# Patient Record
Sex: FEMALE | Race: WHITE | NOT HISPANIC OR LATINO | Employment: FULL TIME | URBAN - METROPOLITAN AREA
[De-identification: names, ages, dates, MRNs, and addresses within clinical notes are randomized per-mention and may not be internally consistent; named-entity substitution may affect disease eponyms.]

---

## 2017-01-03 NOTE — PROGRESS NOTES
SUBJECTIVE:                                                    Citlali Keenan is a 61 year old female who presents to clinic today for the following health issues:      Abnormal Mood Symptoms      Duration: sx started in the fall    Description:  Depression: YES  Anxiety: YES  Panic attacks: no      Accompanying signs and symptoms: see PHQ-9 and FRANTZ scores    History (similar episodes/previous evaluation): None    Precipitating or alleviating factors: None    Therapies tried and outcome: Effexor XR (Venafaxine), hair loss, fatigue     Insomnia      Duration: last summer/fall    Description  Frequency of insomnia:  1-2 times a week or more  Time to fall asleep: varies  Middle of night awakening:  nightly  Early morning awakening:  Every morning    Accompanying signs and symptoms:  fatigue and depression/mood changes    History  Similar episodes in past:  YES  Previous evaluation/sleep study:  no     Precipitating or alleviating factors:  New stressful situation: YES  Caffeine intake after lunchtime: no   OTC decongestants: YES- certrizine  Any new medications: no     Therapies tried and outcome: melatonin, magnesium, aleve      Rash      Duration: sx for about 2 months    Description  Location: left buttock for 2 months, neck and elbow  Itching: severe    Intensity:  Sx improved    Accompanying signs and symptoms: itching, scaly, bumpy    History (similar episodes/previous evaluation): None    Precipitating or alleviating factors:  New exposures:  None  Recent travel: no      Therapies tried and outcome: hydrocortisone cream -  not effective, Benadryl/diphenhydramine -  not effective and aloe, metro gel       Rash at base of skull, has had patches on elbows and back of leg.  Has tried benadryl, hydrocortisone, aloe which are not helping.      Has a history of depression, last 10 years ago.  Was on effexor in the past, took for about 3 years.  Thinks it made her tired and contributed to her hair loss.  Has lost in  "laws,  is stressed.  Not sleeping well due to waking and ruminating.        Problem list and histories reviewed & adjusted, as indicated.  Additional history: as documented    Problem list, Medication list, Allergies, and Medical/Social/Surgical histories reviewed in [facility] and updated as appropriate.    ROS:  Constitutional, HEENT, cardiovascular, pulmonary, gi and gu systems are negative, except as otherwise noted.    OBJECTIVE:                                                    /80 mmHg  Pulse 65  Temp(Src) 97.3  F (36.3  C) (Tympanic)  Ht 5' 4\" (1.626 m)  Wt 140 lb 7 oz (63.702 kg)  BMI 24.09 kg/m2  SpO2 100%  LMP 01/01/2011  Body mass index is 24.09 kg/(m^2).  GENERAL: healthy, alert and no distress  SKIN: erythema of back of head with flaking of kin   PSYCH: mentation appears normal, affect tearful    Diagnostic Test Results:  none      ASSESSMENT/PLAN:                                                    I spent 20 min face to face with patient over 50% in counseling on issues as detailed below     (L30.9) Eczema, unspecified type  (primary encounter diagnosis)  -will treat scalp with synalar, can treat areas on body with kenalog as needed  -if not improving over 2 weeks will need to see derm  Plan: fluocinolone (SYNALAR) 0.01 % solution,         triamcinolone (KENALOG) 0.1 % ointment            (F33.1) Moderate episode of recurrent major depressive disorder (H)  -discussed therapy and medications with patient  -recommendations given for therapists  -discussed possible side effects of meds  -follow up in 1 month  Plan: escitalopram (LEXAPRO) 10 MG tablet           (G47.00) Insomnia, unspecified type  -related to depression and anxiety  -will not treat at this time, will see if lexapro improving symptoms will help with sleep     FUTURE APPOINTMENTS:       - Follow-up visit in 1 month, scheduled.      Magda Boone MD  [facility] [place]      "

## 2017-01-04 ENCOUNTER — OFFICE VISIT (OUTPATIENT)
Dept: PEDIATRICS | Facility: CLINIC | Age: 62
End: 2017-01-04
Payer: COMMERCIAL

## 2017-01-04 VITALS
DIASTOLIC BLOOD PRESSURE: 80 MMHG | TEMPERATURE: 97.3 F | SYSTOLIC BLOOD PRESSURE: 122 MMHG | HEIGHT: 64 IN | OXYGEN SATURATION: 100 % | BODY MASS INDEX: 23.98 KG/M2 | WEIGHT: 140.44 LBS | HEART RATE: 65 BPM

## 2017-01-04 DIAGNOSIS — G47.00 INSOMNIA, UNSPECIFIED TYPE: ICD-10-CM

## 2017-01-04 DIAGNOSIS — F33.1 MODERATE EPISODE OF RECURRENT MAJOR DEPRESSIVE DISORDER (H): ICD-10-CM

## 2017-01-04 DIAGNOSIS — L30.9 ECZEMA, UNSPECIFIED TYPE: Primary | ICD-10-CM

## 2017-01-04 PROCEDURE — 99214 OFFICE O/P EST MOD 30 MIN: CPT | Performed by: INTERNAL MEDICINE

## 2017-01-04 RX ORDER — TRIAMCINOLONE ACETONIDE 1 MG/G
OINTMENT TOPICAL
Qty: 80 G | Refills: 0 | Status: SHIPPED | OUTPATIENT
Start: 2017-01-04 | End: 2017-03-03 | Stop reason: SINTOL

## 2017-01-04 RX ORDER — FLUOCINOLONE ACETONIDE 0.1 MG/ML
SOLUTION TOPICAL 2 TIMES DAILY
Qty: 90 ML | Refills: 0 | Status: SHIPPED | OUTPATIENT
Start: 2017-01-04 | End: 2017-03-03 | Stop reason: SINTOL

## 2017-01-04 RX ORDER — ESCITALOPRAM OXALATE 10 MG/1
TABLET ORAL
Qty: 30 TABLET | Refills: 1 | Status: SHIPPED | OUTPATIENT
Start: 2017-01-04 | End: 2017-03-03

## 2017-01-04 ASSESSMENT — ANXIETY QUESTIONNAIRES
7. FEELING AFRAID AS IF SOMETHING AWFUL MIGHT HAPPEN: MORE THAN HALF THE DAYS
GAD7 TOTAL SCORE: 8
IF YOU CHECKED OFF ANY PROBLEMS ON THIS QUESTIONNAIRE, HOW DIFFICULT HAVE THESE PROBLEMS MADE IT FOR YOU TO DO YOUR WORK, TAKE CARE OF THINGS AT HOME, OR GET ALONG WITH OTHER PEOPLE: SOMEWHAT DIFFICULT
2. NOT BEING ABLE TO STOP OR CONTROL WORRYING: MORE THAN HALF THE DAYS
3. WORRYING TOO MUCH ABOUT DIFFERENT THINGS: MORE THAN HALF THE DAYS
6. BECOMING EASILY ANNOYED OR IRRITABLE: MORE THAN HALF THE DAYS
5. BEING SO RESTLESS THAT IT IS HARD TO SIT STILL: NOT AT ALL
1. FEELING NERVOUS, ANXIOUS, OR ON EDGE: NOT AT ALL

## 2017-01-04 ASSESSMENT — PATIENT HEALTH QUESTIONNAIRE - PHQ9: 5. POOR APPETITE OR OVEREATING: NOT AT ALL

## 2017-01-04 NOTE — MR AVS SNAPSHOT
After Visit Summary   1/4/2017    Citlali Keenan    MRN: [id number]           Patient Information     Date Of Birth          1955        Visit Information        Provider Department      1/4/2017 8:20 AM Magda Boone MD [facility]        Today's Diagnoses     Eczema, unspecified type    -  1     Moderate episode of recurrent major depressive disorder (H)         Insomnia, unspecified type           Care Instructions    [facility]        Follow-ups after your visit        Your next 10 appointments already scheduled     Jan 31, 2017  8:40 AM   Office Visit with Magda Boone MD   [facility] ([facility])    [address]  [address]  [place] [address] [phone]           Bring a current list of meds and any records pertaining to this visit.  For Physicals, please bring immunization records and any forms needing to be filled out.  Please arrive 10 minutes early to complete paperwork.              Who to contact     If you have questions or need follow up information about today's clinic visit or your schedule please contact [facility] directly at [phone].  Normal or non-critical lab and imaging results will be communicated to you by MyChart, letter or phone within 4 business days after the clinic has received the results. If you do not hear from us within 7 days, please contact the clinic through [employer] or phone. If you have a critical or abnormal lab result, we will notify you by phone as soon as possible.  Submit refill requests through [employer] or call your pharmacy and they will forward the refill request to us. Please allow 3 business days for your refill to be completed.          Additional Information About Your Visit        MyChart Information     [employer] gives you secure access to your electronic health record. If you see a  "primary care provider, you can also send messages to your care team and make appointments. If you have questions, please call your primary care clinic.  If you do not have a primary care provider, please call [phone] and they will assist you.        Care EveryWhere ID     This is your Care EveryWhere ID. This could be used by other organizations to access your [facility] medical records  [id number]        Your Vitals Were     Pulse Temperature Height BMI (Body Mass Index) Pulse Oximetry Last Period    65 97.3  F (36.3  C) (Tympanic) 5' 4\" (1.626 m) 24.09 kg/m2 100% 01/01/2011       Blood Pressure from Last 3 Encounters:   01/04/17 122/80   09/07/16 114/70   09/08/15 120/80    Weight from Last 3 Encounters:   01/04/17 140 lb 7 oz (63.702 kg)   09/07/16 142 lb 8 oz (64.638 kg)   09/08/15 135 lb 3 oz (61.321 kg)              Today, you had the following     No orders found for display         Today's Medication Changes          These changes are accurate as of: 1/4/17  9:13 AM.  If you have any questions, ask your nurse or doctor.               Start taking these medicines.        Dose/Directions    escitalopram 10 MG tablet   Commonly known as:  LEXAPRO   Used for:  Moderate episode of recurrent major depressive disorder (H)   Started by:  Magda Boone MD        1/2 pill daily x 7 days then 1 pill daily   Quantity:  30 tablet   Refills:  1       fluocinolone 0.01 % solution   Commonly known as:  SYNALAR   Used for:  Eczema, unspecified type   Started by:  Magda Boone MD        Apply topically 2 times daily   Quantity:  90 mL   Refills:  0       triamcinolone 0.1 % ointment   Commonly known as:  KENALOG   Used for:  Eczema, unspecified type   Started by:  Magda Boone MD        Apply sparingly to affected area three times daily for 14 days.   Quantity:  80 g   Refills:  0            Where to get your medicines      These medications were sent to [facility]/pharmacy #[id number] - [place], " [place] - [address] AT [facility]  [address], [place] [address]     Phone:  [phone]    - escitalopram 10 MG tablet  - fluocinolone 0.01 % solution  - triamcinolone 0.1 % ointment             Primary Care Provider Office Phone # Fax #    Magda Boone [phone]       [facility] [address] [place] [address]        Thank you!     Thank you for choosing [facility]  for your care. Our goal is always to provide you with excellent care. Hearing back from our patients is one way we can continue to improve our services. Please take a few minutes to complete the written survey that you may receive in the mail after your visit with us. Thank you!             Your Updated Medication List - Protect others around you: Learn how to safely use, store and throw away your medicines at www.disposemymeds.org.          This list is accurate as of: 1/4/17  9:13 AM.  Always use your most recent med list.                   Brand Name Dispense Instructions for use    BIOTIN          cetirizine 10 MG tablet    zyrTEC     Take 10 mg by mouth daily       escitalopram 10 MG tablet    LEXAPRO    30 tablet    1/2 pill daily x 7 days then 1 pill daily       fluocinolone 0.01 % solution    SYNALAR    90 mL    Apply topically 2 times daily       ibuprofen 200 MG capsule      Take 200 mg by mouth every 4 hours as needed for fever.       lisinopril 20 MG tablet    PRINIVIL/ZESTRIL    90 tablet    Take 1 tablet (20 mg) by mouth daily       MULTIVITAMINS PO      Take  by mouth daily.       olopatadine 0.1 % ophthalmic solution    PATANOL    1 Bottle    Place 1 drop into both eyes 2 times daily       triamcinolone 0.1 % ointment    KENALOG    80 g    Apply sparingly to affected area three times daily for 14 days.

## 2017-01-04 NOTE — NURSING NOTE
"Chief Complaint   Patient presents with     Derm Problem     Insomnia     Depression       Initial /80 mmHg  Pulse 65  Temp(Src) 97.3  F (36.3  C) (Tympanic)  Ht 5' 4\" (1.626 m)  Wt 140 lb 7 oz (63.702 kg)  BMI 24.09 kg/m2  SpO2 100%  LMP 01/01/2011 Estimated body mass index is 24.09 kg/(m^2) as calculated from the following:    Height as of this encounter: 5' 4\" (1.626 m).    Weight as of this encounter: 140 lb 7 oz (63.702 kg).  BP completed using cuff size: regular  Maggy Koch, RYANN      "

## 2017-01-05 ASSESSMENT — PATIENT HEALTH QUESTIONNAIRE - PHQ9: SUM OF ALL RESPONSES TO PHQ QUESTIONS 1-9: 10

## 2017-01-05 ASSESSMENT — ANXIETY QUESTIONNAIRES: GAD7 TOTAL SCORE: 8

## 2017-03-02 NOTE — PROGRESS NOTES
SUBJECTIVE:                                                    Citlali Keenan is a 62 year old female who presents to clinic today for the following health issues:      Depression Followup    Status since last visit: Improved     See PHQ-9 for current symptoms.  Other associated symptoms: None    Complicating factors:   Significant life event:  No   Current substance abuse:  None  Anxiety or Panic symptoms:  No    PHQ-9  English PHQ-9   Any Language            Amount of exercise or physical activity: None    Problems taking medications regularly: No    Medication side effects: eczema med-causes burning sensation  Diet: regular (no restrictions)    Anxiety- feels that mood is more level, thoughts easier to control.  No side effects of medications noted.  Feels that relationships are much better in general.  Has not seen a therapist but is planning on it.      Still having problems with eczema, had burning sensation on scalp with use of medications.  Would like to have a skin check.         Problem list and histories reviewed & adjusted, as indicated.  Additional history: as documented    Patient Active Problem List   Diagnosis     CARDIOVASCULAR SCREENING; LDL GOAL LESS THAN 160     Advanced directives, counseling/discussion     HTN (hypertension)     Raynaud's phenomenon     Osteopenia     Menopause     Family history of breast cancer in mother     Past Surgical History   Procedure Laterality Date     C ligate fallopian tube       C anesth,blepharoplasty  2004       Social History   Substance Use Topics     Smoking status: Passive Smoke Exposure - Never Smoker     Smokeless tobacco: Never Used      Comment:  a closet smoker     Alcohol use Yes      Comment: 2 glasses of wine per month     Family History   Problem Relation Age of Onset     Hypertension Mother      Lipids Mother      Breast Cancer Mother      mother diagnose with breast cancer last fall and had lumpectomy at age 83     Skin Cancer Mother       "basal cell      Hypertension Father      Hearing Loss Father      father has hearing issue, fitted for hearing aid at age 83     Prostate Cancer Maternal Grandfather      CANCER Paternal Grandmother      ovarian?           Reviewed and updated as needed this visit by clinical staff  Tobacco  Allergies  Meds  Med Hx  Surg Hx  Fam Hx  Soc Hx      Reviewed and updated as needed this visit by Provider         ROS:  Constitutional, HEENT, cardiovascular, pulmonary, gi and gu systems are negative, except as otherwise noted.    OBJECTIVE:                                                    /76 (BP Location: Right arm, Patient Position: Chair, Cuff Size: Adult Regular)  Pulse 63  Temp 97.4  F (36.3  C) (Tympanic)  Ht 5' 4\" (1.626 m)  Wt 135 lb 6 oz (61.4 kg)  LMP 01/01/2011  SpO2 95%  BMI 23.24 kg/m2  Body mass index is 23.24 kg/(m^2).  GENERAL: healthy, alert and no distress  PSYCH: mentation appears normal, affect normal/bright    Diagnostic Test Results:  none      ASSESSMENT/PLAN:                                                    I spent 15 min face to face with patient over 50% in counseling on issues as detailed below.      (F33.1) Moderate episode of recurrent major depressive disorder (H)  (primary encounter diagnosis)  -doing much better on lexapro, phq 9 decreased from 10 to 3  -now controlled  -encouraged therapy to discuss issues that lead to depression  Plan: escitalopram (LEXAPRO) 10 MG tablet           (L30.8) Other eczema  -will refer to dermatology  Plan: DERMATOLOGY REFERRAL            (Z12.31) Visit for screening mammogram  Plan: *MA Screening Digital Bilateral           FUTURE APPOINTMENTS:       - Follow-up for annual visit or as needed    Magda Boone MD  [facility] [place]    "

## 2017-03-03 ENCOUNTER — OFFICE VISIT (OUTPATIENT)
Dept: PEDIATRICS | Facility: CLINIC | Age: 62
End: 2017-03-03
Payer: COMMERCIAL

## 2017-03-03 VITALS
HEART RATE: 63 BPM | WEIGHT: 135.38 LBS | DIASTOLIC BLOOD PRESSURE: 76 MMHG | OXYGEN SATURATION: 95 % | TEMPERATURE: 97.4 F | BODY MASS INDEX: 23.11 KG/M2 | HEIGHT: 64 IN | SYSTOLIC BLOOD PRESSURE: 104 MMHG

## 2017-03-03 DIAGNOSIS — F33.1 MODERATE EPISODE OF RECURRENT MAJOR DEPRESSIVE DISORDER (H): Primary | ICD-10-CM

## 2017-03-03 DIAGNOSIS — Z12.31 VISIT FOR SCREENING MAMMOGRAM: ICD-10-CM

## 2017-03-03 DIAGNOSIS — L30.8 OTHER ECZEMA: ICD-10-CM

## 2017-03-03 PROCEDURE — 99213 OFFICE O/P EST LOW 20 MIN: CPT | Performed by: INTERNAL MEDICINE

## 2017-03-03 RX ORDER — ESCITALOPRAM OXALATE 10 MG/1
10 TABLET ORAL DAILY
Qty: 90 TABLET | Refills: 3 | Status: SHIPPED | OUTPATIENT
Start: 2017-03-03 | End: 2017-10-13

## 2017-03-03 ASSESSMENT — ANXIETY QUESTIONNAIRES
2. NOT BEING ABLE TO STOP OR CONTROL WORRYING: SEVERAL DAYS
3. WORRYING TOO MUCH ABOUT DIFFERENT THINGS: SEVERAL DAYS
5. BEING SO RESTLESS THAT IT IS HARD TO SIT STILL: NOT AT ALL
1. FEELING NERVOUS, ANXIOUS, OR ON EDGE: NOT AT ALL
7. FEELING AFRAID AS IF SOMETHING AWFUL MIGHT HAPPEN: SEVERAL DAYS
6. BECOMING EASILY ANNOYED OR IRRITABLE: NOT AT ALL
GAD7 TOTAL SCORE: 3
IF YOU CHECKED OFF ANY PROBLEMS ON THIS QUESTIONNAIRE, HOW DIFFICULT HAVE THESE PROBLEMS MADE IT FOR YOU TO DO YOUR WORK, TAKE CARE OF THINGS AT HOME, OR GET ALONG WITH OTHER PEOPLE: NOT DIFFICULT AT ALL

## 2017-03-03 ASSESSMENT — PATIENT HEALTH QUESTIONNAIRE - PHQ9: 5. POOR APPETITE OR OVEREATING: NOT AT ALL

## 2017-03-03 NOTE — MR AVS SNAPSHOT
After Visit Summary   3/3/2017    Citlali Keenan    MRN: [id number]           Patient Information     Date Of Birth          1955        Visit Information        Provider Department      3/3/2017 1:00 PM Magda [facility]one MD [facility]        Today's Diagnoses     Moderate episode of recurrent major depressive disorder (H)    -  1    Other eczema        Visit for screening mammogram           Follow-ups after your visit        Additional Services     DERMATOLOGY REFERRAL       Your provider has referred you to: [facility]: [facility] [place] [phone]   [email or web address]    Please be aware that coverage of these services is subject to the terms and limitations of your health insurance plan.  Call member services at your health plan with any benefit or coverage questions.      Please bring the following with you to your appointment:    (1) Any X-Rays, CTs or MRIs which have been performed.  Contact the facility where they were done to arrange for  prior to your scheduled appointment.    (2) List of current medications  (3) This referral request   (4) Any documents/labs given to you for this referral                  Your next 10 appointments already scheduled     Mar 03, 2017  1:30 PM CST   MA SCREENING DIGITAL BILATERAL with EAMA1   [facility] [place] ([facility])    [address] ,[address]  [place] [address] [phone]           Do not use any powder, lotion or deodorant under your arms or on your breast. If you do, we will ask you to remove it before your exam.  Wear comfortable, two-piece clothing.  If you have any allergies, tell your care team.  Bring any previous mammograms from other facilities or have them mailed to the breast center.              Who to contact     If you have questions or need follow up information about today's clinic visit or your schedule please contact [facility]  "[place] directly at [phone].  Normal or non-critical lab and imaging results will be communicated to you by [employer], letter or phone within 4 business days after the clinic has received the results. If you do not hear from us within 7 days, please contact the clinic through [employer] or phone. If you have a critical or abnormal lab result, we will notify you by phone as soon as possible.  Submit refill requests through [employer] or call your pharmacy and they will forward the refill request to us. Please allow 3 business days for your refill to be completed.          Additional Information About Your Visit        [employer] Information     [employer] gives you secure access to your electronic health record. If you see a primary care provider, you can also send messages to your care team and make appointments. If you have questions, please call your primary care clinic.  If you do not have a primary care provider, please call [phone] and they will assist you.        Care EveryWhere ID     This is your Care EveryWhere ID. This could be used by other organizations to access your [facility] medical records  [id number]        Your Vitals Were     Pulse Temperature Height Last Period Pulse Oximetry BMI (Body Mass Index)    63 97.4  F (36.3  C) (Tympanic) 5' 4\" (1.626 m) 01/01/2011 95% 23.24 kg/m2       Blood Pressure from Last 3 Encounters:   03/03/17 104/76   01/04/17 122/80   09/07/16 114/70    Weight from Last 3 Encounters:   03/03/17 135 lb 6 oz (61.4 kg)   01/04/17 140 lb 7 oz (63.7 kg)   09/07/16 142 lb 8 oz (64.6 kg)              We Performed the Following     DERMATOLOGY REFERRAL          Today's Medication Changes          These changes are accurate as of: 3/3/17  1:19 PM.  If you have any questions, ask your nurse or doctor.               These medicines have changed or have updated prescriptions.        Dose/Directions    escitalopram 10 MG tablet   Commonly known as:  LEXAPRO   This may have changed:    - how " much to take  - how to take this  - when to take this   Used for:  Moderate episode of recurrent major depressive disorder (H)   Changed by:  Magda Boone MD        Dose:  10 mg   Take 1 tablet (10 mg) by mouth daily 1/2 pill daily x 7 days then 1 pill daily   Quantity:  90 tablet   Refills:  3         Stop taking these medicines if you haven't already. Please contact your care team if you have questions.     fluocinolone 0.01 % solution   Commonly known as:  SYNALAR   Stopped by:  Magda Boone MD           triamcinolone 0.1 % ointment   Commonly known as:  KENALOG   Stopped by:  Magda Boone MD                Where to get your medicines      These medications were sent to [facility]/pharmacy #[id number] - [place], [place] - [address] [place] AT [address] [place], [place] [address]     Phone:  [phone]     escitalopram 10 MG tablet                Primary Care Provider Office Phone # Fax #    Magda Boone [phone]       [address] [place] [address]        Thank you!     Thank you for choosing [facility]  for your care. Our goal is always to provide you with excellent care. Hearing back from our patients is one way we can continue to improve our services. Please take a few minutes to complete the written survey that you may receive in the mail after your visit with us. Thank you!             Your Updated Medication List - Protect others around you: Learn how to safely use, store and throw away your medicines at www.disposemymeds.org.          This list is accurate as of: 3/3/17  1:19 PM.  Always use your most recent med list.                   Brand Name Dispense Instructions for use    BIOTIN          cetirizine 10 MG tablet    zyrTEC     Take 10 mg by mouth daily       escitalopram 10 MG tablet    LEXAPRO    90 tablet    Take 1 tablet (10 mg) by mouth daily 1/2 pill daily x 7  days then 1 pill daily       ibuprofen 200 MG capsule      Take 200 mg by mouth every 4 hours as needed for fever.       lisinopril 20 MG tablet    PRINIVIL/ZESTRIL    90 tablet    Take 1 tablet (20 mg) by mouth daily       MULTIVITAMINS PO      Take  by mouth daily.       olopatadine 0.1 % ophthalmic solution    PATANOL    1 Bottle    Place 1 drop into both eyes 2 times daily

## 2017-03-04 ASSESSMENT — PATIENT HEALTH QUESTIONNAIRE - PHQ9: SUM OF ALL RESPONSES TO PHQ QUESTIONS 1-9: 3

## 2017-03-04 ASSESSMENT — ANXIETY QUESTIONNAIRES: GAD7 TOTAL SCORE: 3

## 2017-04-11 ENCOUNTER — TRANSFERRED RECORDS (OUTPATIENT)
Dept: HEALTH INFORMATION MANAGEMENT | Facility: CLINIC | Age: 62
End: 2017-04-11

## 2017-04-24 ENCOUNTER — RADIANT APPOINTMENT (OUTPATIENT)
Dept: MAMMOGRAPHY | Facility: CLINIC | Age: 62
End: 2017-04-24
Attending: INTERNAL MEDICINE
Payer: COMMERCIAL

## 2017-04-24 DIAGNOSIS — Z12.31 VISIT FOR SCREENING MAMMOGRAM: ICD-10-CM

## 2017-04-24 PROCEDURE — 77063 BREAST TOMOSYNTHESIS BI: CPT | Mod: TC

## 2017-04-24 PROCEDURE — G0202 SCR MAMMO BI INCL CAD: HCPCS | Mod: TC

## 2017-10-13 ENCOUNTER — OFFICE VISIT (OUTPATIENT)
Dept: PEDIATRICS | Facility: CLINIC | Age: 62
End: 2017-10-13
Payer: COMMERCIAL

## 2017-10-13 VITALS
HEIGHT: 64 IN | DIASTOLIC BLOOD PRESSURE: 72 MMHG | SYSTOLIC BLOOD PRESSURE: 108 MMHG | BODY MASS INDEX: 23.63 KG/M2 | WEIGHT: 138.4 LBS | HEART RATE: 68 BPM | OXYGEN SATURATION: 99 % | TEMPERATURE: 97.9 F

## 2017-10-13 DIAGNOSIS — I10 ESSENTIAL HYPERTENSION: ICD-10-CM

## 2017-10-13 DIAGNOSIS — Z00.00 ROUTINE GENERAL MEDICAL EXAMINATION AT A HEALTH CARE FACILITY: Primary | ICD-10-CM

## 2017-10-13 DIAGNOSIS — F33.1 MODERATE EPISODE OF RECURRENT MAJOR DEPRESSIVE DISORDER (H): ICD-10-CM

## 2017-10-13 LAB
ANION GAP SERPL CALCULATED.3IONS-SCNC: 12 MMOL/L (ref 3–14)
BUN SERPL-MCNC: 15 MG/DL (ref 7–30)
CALCIUM SERPL-MCNC: 8.9 MG/DL (ref 8.5–10.1)
CHLORIDE SERPL-SCNC: 108 MMOL/L (ref 94–109)
CO2 SERPL-SCNC: 24 MMOL/L (ref 20–32)
CREAT SERPL-MCNC: 0.84 MG/DL (ref 0.52–1.04)
CREAT UR-MCNC: 196 MG/DL
GFR SERPL CREATININE-BSD FRML MDRD: 68 ML/MIN/1.7M2
GLUCOSE SERPL-MCNC: 87 MG/DL (ref 70–99)
MICROALBUMIN UR-MCNC: 10 MG/L
MICROALBUMIN/CREAT UR: 5.1 MG/G CR (ref 0–25)
POTASSIUM SERPL-SCNC: 3.9 MMOL/L (ref 3.4–5.3)
SODIUM SERPL-SCNC: 144 MMOL/L (ref 133–144)

## 2017-10-13 PROCEDURE — 36415 COLL VENOUS BLD VENIPUNCTURE: CPT | Performed by: INTERNAL MEDICINE

## 2017-10-13 PROCEDURE — 80048 BASIC METABOLIC PNL TOTAL CA: CPT | Performed by: INTERNAL MEDICINE

## 2017-10-13 PROCEDURE — 99396 PREV VISIT EST AGE 40-64: CPT | Performed by: INTERNAL MEDICINE

## 2017-10-13 PROCEDURE — 82043 UR ALBUMIN QUANTITATIVE: CPT | Performed by: INTERNAL MEDICINE

## 2017-10-13 RX ORDER — LISINOPRIL 20 MG/1
20 TABLET ORAL DAILY
Qty: 90 TABLET | Refills: 3 | Status: SHIPPED | OUTPATIENT
Start: 2017-10-13 | End: 2018-09-21

## 2017-10-13 RX ORDER — ESCITALOPRAM OXALATE 10 MG/1
10 TABLET ORAL DAILY
Qty: 90 TABLET | Refills: 3 | Status: SHIPPED | OUTPATIENT
Start: 2017-10-13 | End: 2018-11-27

## 2017-10-13 NOTE — MR AVS SNAPSHOT
After Visit Summary   10/13/2017    Citlali Keenan    MRN: [id number]           Patient Information     Date Of Birth          1955        Visit Information        Provider Department      10/13/2017 8:40 AM Magda Boone MD [facility]        Today's Diagnoses     Routine general medical examination at a health care facility    -  1    Essential hypertension        Moderate episode of recurrent major depressive disorder (H)          Care Instructions    Pay attention to the salt in the diet that you are not salting such as processed foods and soups. Aim for 2.5 MG of salt a day.     Next time you are at the pharmacy, ask about the shingles vaccination and your insurance, they can tell you if it is covered.     You will have blood work today.       Preventive Health Recommendations  Female Ages 50 - 64    Yearly exam: See your health care provider every year in order to  o Review health changes.   o Discuss preventive care.    o Review your medicines if your doctor has prescribed any.      Get a Pap test every three years (unless you have an abnormal result and your provider advises testing more often).    If you get Pap tests with HPV test, you only need to test every 5 years, unless you have an abnormal result.     You do not need a Pap test if your uterus was removed (hysterectomy) and you have not had cancer.    You should be tested each year for STDs (sexually transmitted diseases) if you're at risk.     Have a mammogram every 1 to 2 years.    Have a colonoscopy at age 50, or have a yearly FIT test (stool test). These exams screen for colon cancer.      Have a cholesterol test every 5 years, or more often if advised.    Have a diabetes test (fasting glucose) every three years. If you are at risk for diabetes, you should have this test more often.     If you are at risk for osteoporosis (brittle bone disease), think about having a bone density scan (DEXA).    Shots: Get  a flu shot each year. Get a tetanus shot every 10 years.    Nutrition:     Eat at least 5 servings of fruits and vegetables each day.    Eat whole-grain bread, whole-wheat pasta and brown rice instead of white grains and rice.    Talk to your provider about Calcium and Vitamin D.     Lifestyle    Exercise at least 150 minutes a week (30 minutes a day, 5 days a week). This will help you control your weight and prevent disease.    Limit alcohol to one drink per day.    No smoking.     Wear sunscreen to prevent skin cancer.     See your dentist every six months for an exam and cleaning.    See your eye doctor every 1 to 2 years.            Follow-ups after your visit        Who to contact     If you have questions or need follow up information about today's clinic visit or your schedule please contact [facility] directly at [phone].  Normal or non-critical lab and imaging results will be communicated to you by [employer], letter or phone within 4 business days after the clinic has received the results. If you do not hear from us within 7 days, please contact the clinic through [facility] or phone. If you have a critical or abnormal lab result, we will notify you by phone as soon as possible.  Submit refill requests through [employer] or call your pharmacy and they will forward the refill request to us. Please allow 3 business days for your refill to be completed.          Additional Information About Your Visit        [employer] Information     [employer] gives you secure access to your electronic health record. If you see a primary care provider, you can also send messages to your care team and make appointments. If you have questions, please call your primary care clinic.  If you do not have a primary care provider, please call [phone] and they will assist you.        Care EveryWhere ID     This is your Care EveryWhere ID. This could be used by other organizations to access your [facility]  "records  [id number]        Your Vitals Were     Pulse Temperature Height Last Period Pulse Oximetry BMI (Body Mass Index)    68 97.9  F (36.6  C) (Tympanic) 5' 4\" (1.626 m) 01/01/2011 99% 23.76 kg/m2       Blood Pressure from Last 3 Encounters:   10/13/17 108/72   03/03/17 104/76   01/04/17 122/80    Weight from Last 3 Encounters:   10/13/17 138 lb 6.4 oz (62.8 kg)   03/03/17 135 lb 6 oz (61.4 kg)   01/04/17 140 lb 7 oz (63.7 kg)              We Performed the Following     Albumin Random Urine Quantitative with Creat Ratio     BASIC METABOLIC PANEL          Today's Medication Changes          These changes are accurate as of: 10/13/17  9:23 AM.  If you have any questions, ask your nurse or doctor.               These medicines have changed or have updated prescriptions.        Dose/Directions    escitalopram 10 MG tablet   Commonly known as:  LEXAPRO   This may have changed:  additional instructions   Used for:  Moderate episode of recurrent major depressive disorder (H)   Changed by:  Magda Boone MD        Dose:  10 mg   Take 1 tablet (10 mg) by mouth daily   Quantity:  90 tablet   Refills:  3       lisinopril 20 MG tablet   Commonly known as:  PRINIVIL/ZESTRIL   This may have changed:  See the new instructions.   Used for:  Essential hypertension   Changed by:  Magda Boone MD        Dose:  20 mg   Take 1 tablet (20 mg) by mouth daily   Quantity:  90 tablet   Refills:  3            Where to get your medicines      These medications were sent to [facility]/pharmacy #[id number] - [place], [place] - [address] AT [place] [address], [place] [address]     Phone:  [phone]     escitalopram 10 MG tablet    lisinopril 20 MG tablet                Primary Care Provider Office Phone # Fax #    Magda Boone [phone]       [facility] [place] [address]        Equal Access to Services     [facility] AH: Christie perla " Rosa, nadirada luconcepcionadaha, qaybta kamalcolm schmid, carmen michaud traceykatiana laRosiedavid annette. So [facility] [phone].    ATENCIÓN: Si jasmina figueroa, tiene a sharp disposición servicios gratuitos de asistencia lingüística. Sherwin al [phone].    We comply with applicable federal civil rights laws and [place] laws. We do not discriminate on the basis of race, color, national origin, age, disability, sex, sexual orientation, or gender identity.            Thank you!     Thank you for choosing [facility] [place]  for your care. Our goal is always to provide you with excellent care. Hearing back from our patients is one way we can continue to improve our services. Please take a few minutes to complete the written survey that you may receive in the mail after your visit with us. Thank you!             Your Updated Medication List - Protect others around you: Learn how to safely use, store and throw away your medicines at www.disposemymeds.org.          This list is accurate as of: 10/13/17  9:23 AM.  Always use your most recent med list.                   Brand Name Dispense Instructions for use Diagnosis    BIOTIN           cetirizine 10 MG tablet    zyrTEC     Take 10 mg by mouth daily        escitalopram 10 MG tablet    LEXAPRO    90 tablet    Take 1 tablet (10 mg) by mouth daily    Moderate episode of recurrent major depressive disorder (H)       ibuprofen 200 MG capsule      Take 200 mg by mouth every 4 hours as needed for fever.        lisinopril 20 MG tablet    PRINIVIL/ZESTRIL    90 tablet    Take 1 tablet (20 mg) by mouth daily    Essential hypertension       MULTIVITAMINS PO      Take  by mouth daily.        olopatadine 0.1 % ophthalmic solution    PATANOL    1 Bottle    Place 1 drop into both eyes 2 times daily    Chronic allergic conjunctivitis

## 2017-10-13 NOTE — PATIENT INSTRUCTIONS
Pay attention to the salt in the diet that you are not salting such as processed foods and soups. Aim for 2.5 MG of salt a day.     Next time you are at the pharmacy, ask about the shingles vaccination and your insurance, they can tell you if it is covered.     You will have blood work today.       Preventive Health Recommendations  Female Ages 50 - 64    Yearly exam: See your health care provider every year in order to  o Review health changes.   o Discuss preventive care.    o Review your medicines if your doctor has prescribed any.      Get a Pap test every three years (unless you have an abnormal result and your provider advises testing more often).    If you get Pap tests with HPV test, you only need to test every 5 years, unless you have an abnormal result.     You do not need a Pap test if your uterus was removed (hysterectomy) and you have not had cancer.    You should be tested each year for STDs (sexually transmitted diseases) if you're at risk.     Have a mammogram every 1 to 2 years.    Have a colonoscopy at age 50, or have a yearly FIT test (stool test). These exams screen for colon cancer.      Have a cholesterol test every 5 years, or more often if advised.    Have a diabetes test (fasting glucose) every three years. If you are at risk for diabetes, you should have this test more often.     If you are at risk for osteoporosis (brittle bone disease), think about having a bone density scan (DEXA).    Shots: Get a flu shot each year. Get a tetanus shot every 10 years.    Nutrition:     Eat at least 5 servings of fruits and vegetables each day.    Eat whole-grain bread, whole-wheat pasta and brown rice instead of white grains and rice.    Talk to your provider about Calcium and Vitamin D.     Lifestyle    Exercise at least 150 minutes a week (30 minutes a day, 5 days a week). This will help you control your weight and prevent disease.    Limit alcohol to one drink per day.    No smoking.     Wear  sunscreen to prevent skin cancer.     See your dentist every six months for an exam and cleaning.    See your eye doctor every 1 to 2 years.

## 2017-10-13 NOTE — NURSING NOTE
"Chief Complaint   Patient presents with     Physical       Initial /72 (BP Location: Right arm, Patient Position: Sitting, Cuff Size: Adult Regular)  Pulse 68  Temp 97.9  F (36.6  C) (Tympanic)  Ht 5' 4\" (1.626 m)  Wt 138 lb 6.4 oz (62.8 kg)  LMP 01/01/2011  SpO2 99%  BMI 23.76 kg/m2 Estimated body mass index is 23.76 kg/(m^2) as calculated from the following:    Height as of this encounter: 5' 4\" (1.626 m).    Weight as of this encounter: 138 lb 6.4 oz (62.8 kg).  Medication Reconciliation: luis Izaguirre      "

## 2017-10-13 NOTE — PROGRESS NOTES
SUBJECTIVE:   CC: Citlali Keenan is an 62 year old woman who presents for preventive health visit.     Citlali presents to the clinic for her annual physical. Patient is currently taking lisinopril 20 MG for hypertension and Lexapro 10MG for depression.     She reports she has been experiencing muscle aches in her legs, arms, shoulders and hips. She uses a heat pad at night to help. Patient works lifting heavy boxes and thinks this may be a cause. Denies joint pain.     States her energy is still low and has been trying to walk more but would like to exercise more. Patient would like to start stretching and yoga but cannot find the time. She is close to care home and will take a some Friday's off, trying to not stress herself.    Mood is reported to be good. States she tried to go off her Lexapro. She tried for 1 week and got withdrawal sx and started back on medication.     Patient reports not taking BP at home. She took a biometrics screening at work and BP was good. Denies cough, chest pain, shortness of breath and presyncopal episodes. BP in clinic was 108/72.    She states she is careful about salts in her diet; realizes she needs to eat less processed foods. Weight in clinic was 138 lbs.     Patient is wondering about shingles vaccination; if she should wait for the new shingles vaccination is out.       Healthy Habits:  Answers for HPI/ROS submitted by the patient on 10/13/2017   Annual Exam:  Getting at least 3 servings of Calcium per day:: Yes  Bi-annual eye exam:: Yes  Dental care twice a year:: Yes  Sleep apnea or symptoms of sleep apnea:: Daytime drowsiness  Diet:: Low salt, Carbohydrate counting  Frequency of exercise:: 1 day/week  Taking medications regularly:: Yes  Medication side effects:: Muscle aches  PHQ-2 Score: 0  Duration of exercise:: 15-30 minutes              Today's PHQ-2 Score:   PHQ-2 ( 1999 Pfizer) 3/3/2017 1/4/2017   Q1: Little interest or pleasure in doing things 0 1   Q2: Feeling  down, depressed or hopeless 1 1   PHQ-2 Score 1 2   Q1: Little interest or pleasure in doing things - -   Q2: Feeling down, depressed or hopeless - -   PHQ-2 Score - -       Abuse: Current or Past(Physical, Sexual or Emotional)- No  Do you feel safe in your environment - Yes    Social History   Substance Use Topics     Smoking status: Passive Smoke Exposure - Never Smoker     Smokeless tobacco: Never Used      Comment:  a closet smoker     Alcohol use Yes      Comment: 2 glasses of wine per month     The patient does not drink >3 drinks per day nor >7 drinks per week.    Reviewed orders with patient.  Reviewed health maintenance and updated orders accordingly - Yes  BP Readings from Last 3 Encounters:   10/13/17 108/72   03/03/17 104/76   01/04/17 122/80    Wt Readings from Last 3 Encounters:   10/13/17 62.8 kg (138 lb 6.4 oz)   03/03/17 61.4 kg (135 lb 6 oz)   01/04/17 63.7 kg (140 lb 7 oz)                  Patient Active Problem List   Diagnosis     CARDIOVASCULAR SCREENING; LDL GOAL LESS THAN 160     Advanced directives, counseling/discussion     HTN (hypertension)     Raynaud's phenomenon     Osteopenia     Menopause     Family history of breast cancer in mother     Moderate episode of recurrent major depressive disorder (H)     Past Surgical History:   Procedure Laterality Date     C ANESTH,BLEPHAROPLASTY  2004     C LIGATE FALLOPIAN TUBE         Social History   Substance Use Topics     Smoking status: Passive Smoke Exposure - Never Smoker     Smokeless tobacco: Never Used      Comment:  a closet smoker     Alcohol use Yes      Comment: 2 glasses of wine per month     Family History   Problem Relation Age of Onset     Hypertension Mother      Lipids Mother      Breast Cancer Mother      mother diagnose with breast cancer last fall and had lumpectomy at age 83     Skin Cancer Mother      basal cell      Hypertension Father      Hearing Loss Father      father has hearing issue, fitted for hearing  aid at age 83     Prostate Cancer Maternal Grandfather      CANCER Paternal Grandmother      ovarian?         Current Outpatient Prescriptions   Medication Sig Dispense Refill     lisinopril (PRINIVIL/ZESTRIL) 20 MG tablet TAKE 1 TABLET (20 MG) BY MOUTH DAILY 30 tablet 0     escitalopram (LEXAPRO) 10 MG tablet Take 1 tablet (10 mg) by mouth daily 1/2 pill daily x 7 days then 1 pill daily 90 tablet 3     cetirizine (ZYRTEC) 10 MG tablet Take 10 mg by mouth daily       Multiple Vitamin (MULTIVITAMINS PO) Take  by mouth daily.       BIOTIN        Ibuprofen 200 MG capsule Take 200 mg by mouth every 4 hours as needed for fever.       olopatadine (PATANOL) 0.1 % ophthalmic solution Place 1 drop into both eyes 2 times daily (Patient not taking: Reported on 10/13/2017) 1 Bottle 11     Allergies   Allergen Reactions     No Known Allergies      Seasonal Allergies            Patient over age 50, mutual decision to screen reflected in health maintenance.      Pertinent mammograms are reviewed under the imaging tab.  History of abnormal Pap smear:   Last 3 Pap Results:   PAP (no units)   Date Value   09/08/2015 NIL   09/07/2011 NIL   02/15/2006 NIL       Reviewed and updated as needed this visit by clinical staff         Reviewed and updated as needed this visit by Provider              ROS:  C: NEGATIVE for fever, chills, change in weight  I: NEGATIVE for worrisome rashes, moles or lesions  E: NEGATIVE for vision changes or irritation  ENT: NEGATIVE for ear, mouth and throat problems  R: NEGATIVE for significant cough or SOB  B: NEGATIVE for masses, tenderness or discharge  CV: NEGATIVE for chest pain, palpitations or peripheral edema  GI: NEGATIVE for nausea, abdominal pain, heartburn, or change in bowel habits  : NEGATIVE for unusual urinary or vaginal symptoms. No vaginal bleeding.  M: NEGATIVE for significant arthralgias; POSITIVE for muscle pain   N: NEGATIVE for weakness, dizziness or paresthesias  P: NEGATIVE for  "changes in mood or affect     This document serves as a record of the services and decisions personally performed and made by Magda Boone MD. It was created on her behalf by Diana Flores, a trained medical scribe. The creation of this document is based the provider's statements to the medical scribe.    Diana Flores October 13, 2017 9:02 AM  OBJECTIVE:   /72 (BP Location: Right arm, Patient Position: Sitting, Cuff Size: Adult Regular)  Pulse 68  Temp 97.9  F (36.6  C) (Tympanic)  Ht 5' 4\" (1.626 m)  Wt 138 lb 6.4 oz (62.8 kg)  LMP 01/01/2011  SpO2 99%  BMI 23.76 kg/m2  EXAM:  GENERAL APPEARANCE: healthy, alert and no distress  EYES: Eyes grossly normal to inspection, PERRL and conjunctivae and sclerae normal  HENT: ear canals and TM's normal, nose and mouth without ulcers or lesions, oropharynx clear and oral mucous membranes moist  NECK: no adenopathy, no asymmetry, masses, or scars and thyroid normal to palpation  RESP: lungs clear to auscultation - no rales, rhonchi or wheezes  BREAST: normal without masses, tenderness or nipple discharge and no palpable axillary masses or adenopathy  CV: regular rate and rhythm, normal S1 S2, no S3 or S4, no murmur, click or rub, no peripheral edema and peripheral pulses strong  ABDOMEN: soft, nontender, no hepatosplenomegaly, no masses and bowel sounds normal  MS: no musculoskeletal defects are noted and gait is age appropriate without ataxia  SKIN: no suspicious lesions or rashes  NEURO: Normal strength and tone, sensory exam grossly normal, mentation intact and speech normal  PSYCH: mentation appears normal and affect normal/bright    ASSESSMENT/PLAN:   (Z00.00) Routine general medical examination at a health care facility  (primary encounter diagnosis)  -- immunizations UTD; flu today   -- lucho not due; last screening revealed dense breasts, will go to every year mammo  -- colon UTD  -- discussed muscle aches, fatigue and getting into more regular " "exercise       (I10) Essential hypertension  -- reported not taking BP at home  -- BP in clinic was 108/72  -- Well controlled with medication without side effects.  -check labs today to evaluate for end organ damage or side effects from meds   Plan: BASIC METABOLIC PANEL, Albumin Random Urine         Quantitative with Creat Ratio, lisinopril         (PRINIVIL/ZESTRIL) 20 MG tablet         (F33.1) Moderate episode of recurrent major depressive disorder (H)  -- patient reports trying to stop medication with withdrawal effects  -- advised how to discontinue medication if she wishes   -- Well controlled with medication without side effects.  -no change to medications   Plan: escitalopram (LEXAPRO) 10 MG tablet            Follow up for annual care or as needed   COUNSELING:   Reviewed preventive health counseling, as reflected in patient instructions       Regular exercise       Healthy diet/nutrition       Immunizations    Vaccinated for: Influenza               reports that she is a non-smoker but has been exposed to tobacco smoke. She has never used smokeless tobacco.    Estimated body mass index is 23.24 kg/(m^2) as calculated from the following:    Height as of 3/3/17: 5' 4\" (1.626 m).    Weight as of 3/3/17: 135 lb 6 oz (61.4 kg).         Counseling Resources:  ATP IV Guidelines  Pooled Cohorts Equation Calculator  Breast Cancer Risk Calculator  FRAX Risk Assessment  ICSI Preventive Guidelines  Dietary Guidelines for Americans, 2010  USDA's MyPlate  ASA Prophylaxis  Lung CA Screening    The information in this document, created by the medical scribe for me, accurately reflects the services I personally performed and the decisions made by me. I have reviewed and approved this document for accuracy prior to leaving the patient care area.  Magda Boone MD  [facility] [place]  "

## 2018-06-19 ENCOUNTER — RADIANT APPOINTMENT (OUTPATIENT)
Dept: MAMMOGRAPHY | Facility: CLINIC | Age: 63
End: 2018-06-19
Payer: COMMERCIAL

## 2018-06-19 DIAGNOSIS — Z12.31 VISIT FOR SCREENING MAMMOGRAM: ICD-10-CM

## 2018-06-19 PROCEDURE — 77063 BREAST TOMOSYNTHESIS BI: CPT | Mod: TC

## 2018-06-19 PROCEDURE — 77067 SCR MAMMO BI INCL CAD: CPT | Mod: TC

## 2018-09-21 DIAGNOSIS — I10 ESSENTIAL HYPERTENSION: ICD-10-CM

## 2018-09-21 RX ORDER — LISINOPRIL 20 MG/1
20 TABLET ORAL DAILY
Qty: 30 TABLET | Refills: 0 | Status: SHIPPED | OUTPATIENT
Start: 2018-09-21 | End: 2018-11-05

## 2018-09-21 NOTE — TELEPHONE ENCOUNTER
Patient is traveling out of town and forgot her medication at home. She is planning to make an appointment for follow up when she returns home. One month supply sent to pharmacy.   Shahida Garcia RN

## 2018-11-05 DIAGNOSIS — I10 ESSENTIAL HYPERTENSION: ICD-10-CM

## 2018-11-05 NOTE — TELEPHONE ENCOUNTER
"Requested Prescriptions   Pending Prescriptions Disp Refills     lisinopril (PRINIVIL/ZESTRIL) 20 MG tablet  Last Written Prescription Date:  09/21/2018  Last Fill Quantity: 30 tablet,  # refills: 0   Last office visit: 10/13/2017 with prescribing provider:  Magda Boone MD    Future Office Visit:   Next 5 appointments (look out 90 days)     Nov 27, 2018  2:40 PM CST   PHYSICAL with Magda Boone MD   [facility]    [address] [place] [address]  [address]  [facility] [address]   [phone]                  30 tablet 0     Sig: Take 1 tablet (20 mg) by mouth daily    ACE Inhibitors (Including Combos) Protocol Failed    11/5/2018  3:10 PM       Failed - Blood pressure under 140/90 in past 12 months    BP Readings from Last 3 Encounters:   10/13/17 108/72   03/03/17 104/76   01/04/17 122/80                Failed - Normal serum creatinine on file in past 12 months    Recent Labs   Lab Test  10/13/17   0926   CR  0.84            Failed - Normal serum potassium on file in past 12 months    Recent Labs   Lab Test  10/13/17   0926   POTASSIUM  3.9            Passed - Recent (12 mo) or future (30 days) visit within the authorizing provider's specialty    Patient had office visit in the last 12 months or has a visit in the next 30 days with authorizing provider or within the authorizing provider's specialty.  See \"Patient Info\" tab in inbasket, or \"Choose Columns\" in Meds & Orders section of the refill encounter.             Passed - Patient is age 18 or older       Passed - No active pregnancy on record       Passed - No positive pregnancy test in past 12 months          "

## 2018-11-07 NOTE — TELEPHONE ENCOUNTER
Routing refill request to provider for review/approval because:  Indira given x1 and patient did not follow up, has future appointment scheduled  Labs not current:  Creatinine, potassium  Patient needs to be seen because it has been more than 1 year since last office visit.    Hailey Edwards RN - Triage  [facility]

## 2018-11-07 NOTE — TELEPHONE ENCOUNTER
Will refill when patient schedules appt. Please call to help her schedule, then re-route to me.  Magda Boone MD

## 2018-11-08 RX ORDER — LISINOPRIL 20 MG/1
20 TABLET ORAL DAILY
Qty: 30 TABLET | Refills: 0 | Status: SHIPPED | OUTPATIENT
Start: 2018-11-08 | End: 2018-11-27

## 2018-11-25 ASSESSMENT — ENCOUNTER SYMPTOMS
ABDOMINAL PAIN: 0
HEARTBURN: 0
EYE PAIN: 0
NERVOUS/ANXIOUS: 0
WEAKNESS: 0
BREAST MASS: 0
DIARRHEA: 0
HEMATOCHEZIA: 0
HEADACHES: 0
FEVER: 0
SHORTNESS OF BREATH: 0
HEMATURIA: 0
DIZZINESS: 0
CHILLS: 0
PARESTHESIAS: 0
FREQUENCY: 0
COUGH: 0
JOINT SWELLING: 0
MYALGIAS: 1
CONSTIPATION: 0
ARTHRALGIAS: 0
DYSURIA: 0
SORE THROAT: 0
PALPITATIONS: 0
NAUSEA: 0

## 2018-11-27 ENCOUNTER — OFFICE VISIT (OUTPATIENT)
Dept: PEDIATRICS | Facility: CLINIC | Age: 63
End: 2018-11-27
Payer: COMMERCIAL

## 2018-11-27 VITALS
TEMPERATURE: 98.8 F | OXYGEN SATURATION: 98 % | BODY MASS INDEX: 25.22 KG/M2 | HEIGHT: 64 IN | WEIGHT: 147.7 LBS | HEART RATE: 58 BPM | DIASTOLIC BLOOD PRESSURE: 68 MMHG | SYSTOLIC BLOOD PRESSURE: 104 MMHG

## 2018-11-27 DIAGNOSIS — I10 ESSENTIAL HYPERTENSION: ICD-10-CM

## 2018-11-27 DIAGNOSIS — Z11.4 SCREENING FOR HIV (HUMAN IMMUNODEFICIENCY VIRUS): ICD-10-CM

## 2018-11-27 DIAGNOSIS — F33.1 MODERATE EPISODE OF RECURRENT MAJOR DEPRESSIVE DISORDER (H): ICD-10-CM

## 2018-11-27 DIAGNOSIS — Z00.00 ROUTINE GENERAL MEDICAL EXAMINATION AT A HEALTH CARE FACILITY: Primary | ICD-10-CM

## 2018-11-27 DIAGNOSIS — G43.009 MIGRAINE WITHOUT AURA AND WITHOUT STATUS MIGRAINOSUS, NOT INTRACTABLE: ICD-10-CM

## 2018-11-27 DIAGNOSIS — R53.83 OTHER FATIGUE: ICD-10-CM

## 2018-11-27 LAB
ERYTHROCYTE [DISTWIDTH] IN BLOOD BY AUTOMATED COUNT: 12.9 % (ref 10–15)
HCT VFR BLD AUTO: 36.9 % (ref 35–47)
HGB BLD-MCNC: 11.9 G/DL (ref 11.7–15.7)
MCH RBC QN AUTO: 31.1 PG (ref 26.5–33)
MCHC RBC AUTO-ENTMCNC: 32.2 G/DL (ref 31.5–36.5)
MCV RBC AUTO: 96 FL (ref 78–100)
PLATELET # BLD AUTO: 229 10E9/L (ref 150–450)
RBC # BLD AUTO: 3.83 10E12/L (ref 3.8–5.2)
VIT B12 SERPL-MCNC: 1888 PG/ML (ref 193–986)
WBC # BLD AUTO: 5.1 10E9/L (ref 4–11)

## 2018-11-27 PROCEDURE — 87389 HIV-1 AG W/HIV-1&-2 AB AG IA: CPT | Performed by: INTERNAL MEDICINE

## 2018-11-27 PROCEDURE — 80048 BASIC METABOLIC PNL TOTAL CA: CPT | Performed by: INTERNAL MEDICINE

## 2018-11-27 PROCEDURE — 84443 ASSAY THYROID STIM HORMONE: CPT | Performed by: INTERNAL MEDICINE

## 2018-11-27 PROCEDURE — 82607 VITAMIN B-12: CPT | Performed by: INTERNAL MEDICINE

## 2018-11-27 PROCEDURE — 84439 ASSAY OF FREE THYROXINE: CPT | Performed by: INTERNAL MEDICINE

## 2018-11-27 PROCEDURE — 99396 PREV VISIT EST AGE 40-64: CPT | Performed by: INTERNAL MEDICINE

## 2018-11-27 PROCEDURE — 85027 COMPLETE CBC AUTOMATED: CPT | Performed by: INTERNAL MEDICINE

## 2018-11-27 PROCEDURE — 82043 UR ALBUMIN QUANTITATIVE: CPT | Performed by: INTERNAL MEDICINE

## 2018-11-27 PROCEDURE — 99214 OFFICE O/P EST MOD 30 MIN: CPT | Mod: 25 | Performed by: INTERNAL MEDICINE

## 2018-11-27 PROCEDURE — 36415 COLL VENOUS BLD VENIPUNCTURE: CPT | Performed by: INTERNAL MEDICINE

## 2018-11-27 RX ORDER — ESCITALOPRAM OXALATE 10 MG/1
10 TABLET ORAL DAILY
Qty: 90 TABLET | Refills: 3 | Status: SHIPPED | OUTPATIENT
Start: 2018-11-27 | End: 2020-01-10

## 2018-11-27 RX ORDER — RIZATRIPTAN BENZOATE 10 MG/1
10 TABLET ORAL
Qty: 6 TABLET | Refills: 0 | Status: SHIPPED | OUTPATIENT
Start: 2018-11-27 | End: 2019-08-02

## 2018-11-27 RX ORDER — LISINOPRIL 10 MG/1
10 TABLET ORAL DAILY
Qty: 30 TABLET | Refills: 1 | Status: SHIPPED | OUTPATIENT
Start: 2018-11-27 | End: 2019-01-18

## 2018-11-27 ASSESSMENT — ENCOUNTER SYMPTOMS
ARTHRALGIAS: 0
JOINT SWELLING: 0
HEARTBURN: 0
FEVER: 0
DYSURIA: 0
CHILLS: 0
HEMATOCHEZIA: 0
DIZZINESS: 0
BREAST MASS: 0
SHORTNESS OF BREATH: 0
COUGH: 0
FREQUENCY: 0
PARESTHESIAS: 0
NAUSEA: 0
HEMATURIA: 0
SORE THROAT: 0
WEAKNESS: 0
PALPITATIONS: 0
MYALGIAS: 1
DIARRHEA: 0
EYE PAIN: 0
CONSTIPATION: 0
NERVOUS/ANXIOUS: 0
ABDOMINAL PAIN: 0
HEADACHES: 0

## 2018-11-27 NOTE — MR AVS SNAPSHOT
After Visit Summary   11/27/2018    Citlali Keenan    MRN: [id number]           Patient Information     Date Of Birth          1955        Visit Information        Provider Department      11/27/2018 2:40 PM Magda Boone MD [facility]        Today's Diagnoses     Routine general medical examination at a health care facility    -  1    Essential hypertension        Moderate episode of recurrent major depressive disorder (H)        Other fatigue        Screening for HIV (human immunodeficiency virus)        Migraine without aura and without status migrainosus, not intractable          Care Instructions    Decrease Lisinopril to 10 MG, cut in 1/2; let's see if when your blood pressure is back up, your fatigue improves.     Check your blood pressure once a day 2-3 times a week and write it down for me. Send me your blood pressure log in 3-4 weeks. If you are consistently over 140/90 then I need to know.     Stop at the pharmacy and they will run your insurance to see if the shingles vaccination is covered; they can give you the shot at the pharmacy.             Follow-ups after your visit        Follow-up notes from your care team     Return in about 1 year (around 11/27/2019) for Physical Exam.      Who to contact     If you have questions or need follow up information about today's clinic visit or your schedule please contact [facility] directly at [phone].  Normal or non-critical lab and imaging results will be communicated to you by MyChart, letter or phone within 4 business days after the clinic has received the results. If you do not hear from us within 7 days, please contact the clinic through MyChart or phone. If you have a critical or abnormal lab result, we will notify you by phone as soon as possible.  Submit refill requests through [employer] or call your pharmacy and they will forward the refill request to us. Please allow 3 business days for your  "refill to be completed.          Additional Information About Your Visit        [employer] Information     [employer] gives you secure access to your electronic health record. If you see a primary care provider, you can also send messages to your care team and make appointments. If you have questions, please call your primary care clinic.  If you do not have a primary care provider, please call [phone] and they will assist you.        Care EveryWhere ID     This is your Care EveryWhere ID. This could be used by other organizations to access your [facility] medical records  [id number]        Your Vitals Were     Pulse Temperature Height Last Period Pulse Oximetry BMI (Body Mass Index)    58 98.8  F (37.1  C) (Oral) 5' 3.5\" (1.613 m) 01/01/2011 98% 25.75 kg/m2       Blood Pressure from Last 3 Encounters:   11/27/18 104/68   10/13/17 108/72   03/03/17 104/76    Weight from Last 3 Encounters:   11/27/18 147 lb 11.2 oz (67 kg)   10/13/17 138 lb 6.4 oz (62.8 kg)   03/03/17 135 lb 6 oz (61.4 kg)              We Performed the Following     Albumin Random Urine Quantitative with Creat Ratio     BASIC METABOLIC PANEL     CBC with platelets     HIV Screening     T4 FREE     TSH     Vitamin B12          Today's Medication Changes          These changes are accurate as of 11/27/18  3:11 PM.  If you have any questions, ask your nurse or doctor.               Start taking these medicines.        Dose/Directions    rizatriptan 10 MG tablet   Commonly known as:  MAXALT   Used for:  Migraine without aura and without status migrainosus, not intractable   Started by:  Magda Boone MD        Dose:  10 mg   Take 1 tablet (10 mg) by mouth at onset of headache for migraine May repeat in 2 hours. Max 3 tablets/24 hours.   Quantity:  6 tablet   Refills:  0         These medicines have changed or have updated prescriptions.        Dose/Directions    lisinopril 10 MG tablet   Commonly known as:  PRINIVIL/ZESTRIL   This may have " changed:    - medication strength  - how much to take   Used for:  Essential hypertension   Changed by:  Magda Boone MD        Dose:  10 mg   Take 1 tablet (10 mg) by mouth daily   Quantity:  30 tablet   Refills:  1            Where to get your medicines      These medications were sent to [facility]/pharmacy #[id number] - [place], [place] - [address] [place] [address] [address], [place] [address]     Phone:  [phone]     escitalopram 10 MG tablet    lisinopril 10 MG tablet    rizatriptan 10 MG tablet                Primary Care Provider Office Phone # Fax #    Magda Boone [phone]       [facility] [place] [address]        Equal Access to Services     [facility]: Hadii everett sánchez hadasho Soomaali, waaxda luqadaha, qaybta kaalmada adeegyada, carmen lara . So [facility] [phone].    ATENCIÓN: Si habla español, tiene a sharp disposición servicios gratuitos de asistencia lingüística. [facility] [phone].    We comply with applicable federal civil rights laws and [place] laws. We do not discriminate on the basis of race, color, national origin, age, disability, sex, sexual orientation, or gender identity.            Thank you!     Thank you for choosing [facility]  for your care. Our goal is always to provide you with excellent care. Hearing back from our patients is one way we can continue to improve our services. Please take a few minutes to complete the written survey that you may receive in the mail after your visit with us. Thank you!             Your Updated Medication List - Protect others around you: Learn how to safely use, store and throw away your medicines at www.disposemymeds.org.          This list is accurate as of 11/27/18  3:11 PM.  Always use your most recent med list.                   Brand Name Dispense Instructions for use Diagnosis    BIOTIN           cetirizine 10 MG tablet     zyrTEC     Take 10 mg by mouth daily        escitalopram 10 MG tablet    LEXAPRO    90 tablet    Take 1 tablet (10 mg) by mouth daily    Moderate episode of recurrent major depressive disorder (H)       ibuprofen 200 MG capsule    ADVIL/MOTRIN     Take 200 mg by mouth every 4 hours as needed for fever.        lisinopril 10 MG tablet    PRINIVIL/ZESTRIL    30 tablet    Take 1 tablet (10 mg) by mouth daily    Essential hypertension       MULTIVITAMINS PO      Take  by mouth daily.        rizatriptan 10 MG tablet    MAXALT    6 tablet    Take 1 tablet (10 mg) by mouth at onset of headache for migraine May repeat in 2 hours. Max 3 tablets/24 hours.    Migraine without aura and without status migrainosus, not intractable

## 2018-11-27 NOTE — PATIENT INSTRUCTIONS
Decrease Lisinopril to 10 MG, cut in 1/2; let's see if when your blood pressure is back up, your fatigue improves.     Check your blood pressure once a day 2-3 times a week and write it down for me. Send me your blood pressure log in 3-4 weeks. If you are consistently over 140/90 then I need to know.     Stop at the pharmacy and they will run your insurance to see if the shingles vaccination is covered; they can give you the shot at the pharmacy.

## 2018-11-27 NOTE — PROGRESS NOTES
"   SUBJECTIVE:   CC: Citlali Keenan is an 63 year old woman who presents for preventive health visit.     Physical   Annual:     Getting at least 3 servings of Calcium per day:  Yes    Bi-annual eye exam:  Yes    Dental care twice a year:  Yes    Sleep apnea or symptoms of sleep apnea:  Daytime drowsiness    Diet:  Regular (no restrictions) and Low salt    Frequency of exercise:  1 day/week    Duration of exercise:  Less than 15 minutes    Taking medications regularly:  Yes    Medication side effects:  Other    Additional concerns today:  Yes    PHQ-2 Total Score: 0    Pt reports that she is experiencing a lot of fatigue with lisinopril and wondering if a change to amlodipine would make a change in that. States she has been on lisinopril for a \"long period of time\" and has had fatigue for a \"long period of time\". Does not take her blood pressures at home and is unsure what they are running.     States she gets about 4 migraines per year and advil does not help. Maxalt has helped her in the past; she wants for prn.     Patient is wishing to stay on antidepressants.     Today's PHQ-2 Score:   PHQ-2 ( 1999 Pfizer) 11/25/2018   Q1: Little interest or pleasure in doing things 0   Q2: Feeling down, depressed or hopeless 0   PHQ-2 Score 0   Q1: Little interest or pleasure in doing things Not at all   Q2: Feeling down, depressed or hopeless Not at all   PHQ-2 Score 0       Abuse: Current or Past(Physical, Sexual or Emotional)- No  Do you feel safe in your environment? Yes    Social History   Substance Use Topics     Smoking status: Passive Smoke Exposure - Never Smoker     Smokeless tobacco: Never Used      Comment:  a closet smoker     Alcohol use Yes      Comment: 2 glasses of wine per month     Alcohol Use 11/25/2018   If you drink alcohol do you typically have greater than 3 drinks per day OR greater than 7 drinks per week? No       Reviewed orders with patient.  Reviewed health maintenance and updated orders " accordingly - Yes  Labs reviewed in EPIC  BP Readings from Last 3 Encounters:   11/27/18 104/68   10/13/17 108/72   03/03/17 104/76    Wt Readings from Last 3 Encounters:   11/27/18 67 kg (147 lb 11.2 oz)   10/13/17 62.8 kg (138 lb 6.4 oz)   03/03/17 61.4 kg (135 lb 6 oz)                  Patient Active Problem List   Diagnosis     CARDIOVASCULAR SCREENING; LDL GOAL LESS THAN 160     Advanced directives, counseling/discussion     HTN (hypertension)     Raynaud's phenomenon     Osteopenia     Menopause     Family history of breast cancer in mother     Moderate episode of recurrent major depressive disorder (H)     Past Surgical History:   Procedure Laterality Date     C ANESTH,BLEPHAROPLASTY  2004     C LIGATE FALLOPIAN TUBE         Social History   Substance Use Topics     Smoking status: Passive Smoke Exposure - Never Smoker     Smokeless tobacco: Never Used      Comment:  a closet smoker     Alcohol use Yes      Comment: 2 glasses of wine per month     Family History   Problem Relation Age of Onset     Hypertension Mother      Lipids Mother      Breast Cancer Mother      mother diagnose with breast cancer last fall and had lumpectomy at age 83     Skin Cancer Mother      basal cell      Hypertension Father      Hearing Loss Father      father has hearing issue, fitted for hearing aid at age 83     Prostate Cancer Maternal Grandfather      Cancer Paternal Grandmother      ovarian?         Current Outpatient Prescriptions   Medication Sig Dispense Refill     BIOTIN        cetirizine (ZYRTEC) 10 MG tablet Take 10 mg by mouth daily       escitalopram (LEXAPRO) 10 MG tablet Take 1 tablet (10 mg) by mouth daily 90 tablet 3     Ibuprofen 200 MG capsule Take 200 mg by mouth every 4 hours as needed for fever.       lisinopril (PRINIVIL/ZESTRIL) 20 MG tablet Take 1 tablet (20 mg) by mouth daily 30 tablet 0     Multiple Vitamin (MULTIVITAMINS PO) Take  by mouth daily.       Allergies   Allergen Reactions     No Known  Allergies      Seasonal Allergies        Mammogram Screening: Patient over age 50, mutual decision to screen reflected in health maintenance.    Pertinent mammograms are reviewed under the imaging tab.  History of abnormal Pap smear:   Last 3 Pap Results:   PAP (no units)   Date Value   09/08/2015 NIL   09/07/2011 NIL   02/15/2006 NIL     PAP / HPV Latest Ref Rng & Units 9/8/2015 9/7/2011 2/15/2006   PAP - NIL NIL NIL   HPV 16 DNA NEG Negative - -   HPV 18 DNA NEG Negative - -   OTHER HR HPV NEG Negative - -     Reviewed and updated as needed this visit by clinical staff  Tobacco  Allergies  Meds  Med Hx  Surg Hx  Fam Hx  Soc Hx        Reviewed and updated as needed this visit by Provider            Review of Systems   Constitutional: Negative for chills and fever.   HENT: Positive for congestion. Negative for ear pain, hearing loss and sore throat.    Eyes: Negative for pain and visual disturbance.   Respiratory: Negative for cough and shortness of breath.    Cardiovascular: Negative for chest pain, palpitations and peripheral edema.   Gastrointestinal: Negative for abdominal pain, constipation, diarrhea, heartburn, hematochezia and nausea.   Breasts:  Negative for tenderness, breast mass and discharge.   Genitourinary: Negative for dysuria, frequency, genital sores, hematuria, pelvic pain, urgency, vaginal bleeding and vaginal discharge.   Musculoskeletal: Positive for myalgias. Negative for arthralgias and joint swelling.   Skin: Negative for rash.   Neurological: Negative for dizziness, weakness, headaches and paresthesias.   Psychiatric/Behavioral: Negative for mood changes. The patient is not nervous/anxious.        This document serves as a record of the services and decisions personally performed and made by Magda Boone MD. It was created on her behalf by Diana Flores, a trained medical scribe. The creation of this document is based the provider's statements to the medical scribe.    Diana  "Mark November 27, 2018 2:53 PM   OBJECTIVE:   /68 (BP Location: Right arm, Patient Position: Sitting, Cuff Size: Adult Regular)  Pulse 58  Temp 98.8  F (37.1  C) (Oral)  Ht 1.613 m (5' 3.5\")  Wt 67 kg (147 lb 11.2 oz)  LMP 01/01/2011  SpO2 98%  BMI 25.75 kg/m2  Physical Exam  GENERAL APPEARANCE: healthy, alert and no distress  EYES: Eyes grossly normal to inspection, PERRL and conjunctivae and sclerae normal  HENT: ear canals and TM's normal, nose and mouth without ulcers or lesions, oropharynx clear and oral mucous membranes moist  NECK: no adenopathy, no asymmetry, masses, or scars and thyroid normal to palpation  RESP: lungs clear to auscultation - no rales, rhonchi or wheezes  BREAST: normal without masses, tenderness or nipple discharge and no palpable axillary masses or adenopathy  CV: regular rate and rhythm, normal S1 S2, no S3 or S4, no murmur, click or rub, no peripheral edema  ABDOMEN: soft, nontender, no hepatosplenomegaly, no masses and bowel sounds normal  MS: no musculoskeletal defects are noted and gait is age appropriate without ataxia  SKIN: no suspicious lesions or rashes  NEURO: Normal strength and tone, sensory exam grossly normal, mentation intact and speech normal  PSYCH: mentation appears normal and affect normal/bright    Diagnostic Test Results:  No results found for this or any previous visit (from the past 24 hour(s)).    ASSESSMENT/PLAN:   (Z00.00) Routine general medical examination at a health care facility  (primary encounter diagnosis)  -- imm utd; shingrix recommended   -- pap utd   -- mammo utd   -- colonoscopy utd   -- DEXA utd   -- encouraged regular exercise and balanced diet       (I10) Essential hypertension  -- pt with relatively low bp today; will reduce lisinopril to 10 MG and see if fatigue improves   -- advised patient to check BP 2-3x per week and send BP log in 3-4 weeks   -- patient to contact me if BP consistently over 140/90   Plan: BASIC METABOLIC " "PANEL, Albumin Random Urine         Quantitative with Creat Ratio      (F33.1) Moderate episode of recurrent major depressive disorder (H)  -- controlled; patient wishing to stay on current medications without changes   Plan: escitalopram (LEXAPRO) 10 MG tablet    (R53.83) Other fatigue  -- do not suspect fatigue is side effect from lisinopril, but low BP and/or stress   -- will check B12, thyroid and iron studies to r/o as cause   -- will reduce lisinopril to 10 MG and see if fatigue improves   -pt to check in via mychart in 2 weeks.    Plan: TSH, T4 FREE, CBC with platelets, Vitamin B12    (Z11.4) Screening for HIV (human immunodeficiency virus)  -- taken with routine blood work   Plan: HIV Screening    (G43.009) Migraine without aura and without status migrainosus, not intractable  -- getting about 4 migraines per year, controlled with Maxalt prn   -- refilled for prn   Plan: rizatriptan (MAXALT) 10 MG tablet          Follow up for annual care or as needed         COUNSELING:  Reviewed preventive health counseling, as reflected in patient instructions       Regular exercise       Healthy diet/nutrition    BP Readings from Last 1 Encounters:   11/27/18 104/68     Estimated body mass index is 25.75 kg/(m^2) as calculated from the following:    Height as of this encounter: 1.613 m (5' 3.5\").    Weight as of this encounter: 67 kg (147 lb 11.2 oz).           reports that she is a non-smoker but has been exposed to tobacco smoke. She has never used smokeless tobacco.      Counseling Resources:  ATP IV Guidelines  Pooled Cohorts Equation Calculator  Breast Cancer Risk Calculator  FRAX Risk Assessment  ICSI Preventive Guidelines  Dietary Guidelines for Americans, 2010  USDA's MyPlate  ASA Prophylaxis  Lung CA Screening      The information in this document, created by the medical scribe for me, accurately reflects the services I personally performed and the decisions made by me. I have reviewed and approved this document " for accuracy prior to leaving the patient care area.  Magda Boone MD  [facility]

## 2018-11-28 LAB
ANION GAP SERPL CALCULATED.3IONS-SCNC: 7 MMOL/L (ref 3–14)
BUN SERPL-MCNC: 18 MG/DL (ref 7–30)
CALCIUM SERPL-MCNC: 9.1 MG/DL (ref 8.5–10.1)
CHLORIDE SERPL-SCNC: 108 MMOL/L (ref 94–109)
CO2 SERPL-SCNC: 27 MMOL/L (ref 20–32)
CREAT SERPL-MCNC: 0.94 MG/DL (ref 0.52–1.04)
CREAT UR-MCNC: 229 MG/DL
GFR SERPL CREATININE-BSD FRML MDRD: 60 ML/MIN/1.7M2
GLUCOSE SERPL-MCNC: 94 MG/DL (ref 70–99)
HIV 1+2 AB+HIV1 P24 AG SERPL QL IA: NONREACTIVE
MICROALBUMIN UR-MCNC: 13 MG/L
MICROALBUMIN/CREAT UR: 5.63 MG/G CR (ref 0–25)
POTASSIUM SERPL-SCNC: 4 MMOL/L (ref 3.4–5.3)
SODIUM SERPL-SCNC: 142 MMOL/L (ref 133–144)
T4 FREE SERPL-MCNC: 0.89 NG/DL (ref 0.76–1.46)
TSH SERPL DL<=0.005 MIU/L-ACNC: 0.61 MU/L (ref 0.4–4)

## 2019-01-17 DIAGNOSIS — I10 ESSENTIAL HYPERTENSION: ICD-10-CM

## 2019-01-17 NOTE — TELEPHONE ENCOUNTER
"Requested Prescriptions   Pending Prescriptions Disp Refills     lisinopril (PRINIVIL/ZESTRIL) 10 MG tablet    Last Written Prescription Date:  11/27/2018  Last Fill Quantity: 30,  # refills: 1   Last office visit: 11/27/2018 with prescribing provider:  Magda Boone     Future Office Visit:     30 tablet 1     Sig: Take 1 tablet (10 mg) by mouth daily    ACE Inhibitors (Including Combos) Protocol Passed - 1/17/2019  2:04 PM       Passed - Blood pressure under 140/90 in past 12 months    BP Readings from Last 3 Encounters:   11/27/18 104/68   10/13/17 108/72   03/03/17 104/76                Passed - Recent (12 mo) or future (30 days) visit within the authorizing provider's specialty    Patient had office visit in the last 12 months or has a visit in the next 30 days with authorizing provider or within the authorizing provider's specialty.  See \"Patient Info\" tab in inbasket, or \"Choose Columns\" in Meds & Orders section of the refill encounter.             Passed - Medication is active on med list       Passed - Patient is age 18 or older       Passed - No active pregnancy on record       Passed - Normal serum creatinine on file in past 12 months    Recent Labs   Lab Test 11/27/18  1514   CR 0.94            Passed - Normal serum potassium on file in past 12 months    Recent Labs   Lab Test 11/27/18  1514   POTASSIUM 4.0            Passed - No positive pregnancy test within past 12 months          "

## 2019-01-18 RX ORDER — LISINOPRIL 10 MG/1
10 TABLET ORAL DAILY
Qty: 30 TABLET | Refills: 10 | Status: SHIPPED | OUTPATIENT
Start: 2019-01-18 | End: 2019-12-12

## 2019-06-22 DIAGNOSIS — Z12.31 VISIT FOR SCREENING MAMMOGRAM: ICD-10-CM

## 2019-06-22 PROCEDURE — 77067 SCR MAMMO BI INCL CAD: CPT | Mod: TC

## 2019-08-02 ENCOUNTER — MYC REFILL (OUTPATIENT)
Dept: PEDIATRICS | Facility: CLINIC | Age: 64
End: 2019-08-02

## 2019-08-02 DIAGNOSIS — G43.009 MIGRAINE WITHOUT AURA AND WITHOUT STATUS MIGRAINOSUS, NOT INTRACTABLE: ICD-10-CM

## 2019-08-02 NOTE — TELEPHONE ENCOUNTER
"Requested Prescriptions   Pending Prescriptions Disp Refills     rizatriptan (MAXALT) 10 MG tablet  Last Written Prescription Date:  11/27/2018  Last Fill Quantity: 6 tablet,  # refills: 0   Last Office Visit: 11/27/2018 Magda Boone MD   Future Office Visit:      6 tablet 0     Sig: Take 1 tablet (10 mg) by mouth at onset of headache for migraine May repeat in 2 hours. Max 3 tablets/24 hours.       Serotonin Agonists Failed - 8/2/2019 11:59 AM        Failed - Serotonin Agonist request needs review.     Please review patient's record. If patient has had 8 or more treatments in the past month, please forward to provider.          Passed - Blood pressure under 140/90 in past 12 months     BP Readings from Last 3 Encounters:   11/27/18 104/68   10/13/17 108/72   03/03/17 104/76           Passed - Recent (12 mo) or future (30 days) visit within the authorizing provider's specialty     Patient had office visit in the last 12 months or has a visit in the next 30 days with authorizing provider or within the authorizing provider's specialty.  See \"Patient Info\" tab in inbasket, or \"Choose Columns\" in Meds & Orders section of the refill encounter.              Passed - Medication is active on med list        Passed - Patient is age 18 or older        Passed - No active pregnancy on record        Passed - No positive pregnancy test in past 12 months          "

## 2019-08-04 RX ORDER — RIZATRIPTAN BENZOATE 10 MG/1
10 TABLET ORAL
Qty: 6 TABLET | Refills: 1 | Status: SHIPPED | OUTPATIENT
Start: 2019-08-04 | End: 2020-01-10

## 2019-08-05 NOTE — TELEPHONE ENCOUNTER
Prescription approved per [facility] Refill Protocol.  Supriya Rankin RN

## 2019-08-13 ENCOUNTER — OFFICE VISIT (OUTPATIENT)
Dept: URGENT CARE | Facility: URGENT CARE | Age: 64
End: 2019-08-13
Payer: COMMERCIAL

## 2019-08-13 VITALS
OXYGEN SATURATION: 99 % | SYSTOLIC BLOOD PRESSURE: 110 MMHG | RESPIRATION RATE: 20 BRPM | DIASTOLIC BLOOD PRESSURE: 70 MMHG | BODY MASS INDEX: 24.41 KG/M2 | HEART RATE: 79 BPM | TEMPERATURE: 97.4 F | WEIGHT: 140 LBS

## 2019-08-13 DIAGNOSIS — G43.519 MIGRAINE AURA, PERSISTENT, INTRACTABLE: Primary | ICD-10-CM

## 2019-08-13 PROCEDURE — 96372 THER/PROPH/DIAG INJ SC/IM: CPT | Performed by: PHYSICIAN ASSISTANT

## 2019-08-13 PROCEDURE — 99214 OFFICE O/P EST MOD 30 MIN: CPT | Mod: 25 | Performed by: PHYSICIAN ASSISTANT

## 2019-08-13 RX ORDER — KETOROLAC TROMETHAMINE 30 MG/ML
30 INJECTION, SOLUTION INTRAMUSCULAR; INTRAVENOUS ONCE
Status: COMPLETED | OUTPATIENT
Start: 2019-08-13 | End: 2019-08-13

## 2019-08-13 RX ORDER — RIZATRIPTAN BENZOATE 10 MG/1
10 TABLET ORAL
Qty: 6 TABLET | Refills: 0 | Status: SHIPPED | OUTPATIENT
Start: 2019-08-13

## 2019-08-13 RX ADMIN — KETOROLAC TROMETHAMINE 30 MG: 30 INJECTION, SOLUTION INTRAMUSCULAR; INTRAVENOUS at 10:46

## 2019-08-13 NOTE — PROGRESS NOTES
SUBJECTIVE:  Citlali Keenan is a 64 year old female who comes in for evaluation of headache.  Headache began 2 day(s)}ago and is still present. Third occurrence this month.    DESCRIPTION OF HEADACHE:   Location of pain: left parietal  Radiation of pain?: NO   Character of pain:aching, throbbing, burning and pressure-like   Severity of pain: severe   Accompanying symptoms: nausea, sonophobia and photophobia   Prodromal sx?: none   Rapidity of onset: abrupt     History of Migranes: No   Are most headaches similar in presentation? YES    TYPICAL PRECIPITANTS OF HEADACHE: mowing lawn    CURRENT USE OF MEDS TO TREAT HA:   Abortive meds? maxalt    Daily use? No  Prophylactic meds? lisnopril    ADDITIONAL RELEVANT HISTORY:  Exposure to carbon monoxide? NO  Substance use: caffeine: occasional coffee  Neurologic ROS: None.    Past Medical History:   Diagnosis Date     DEPRESSIVE DISORDER NEC 4/13/2006     Hypertension      Migraine 3/13/2012     Migraine without aura, with intractable migraine, so stated, without mention of status migrainosus      Mild major depression (H) 8/10/2011     Current Outpatient Medications   Medication Sig Dispense Refill     BIOTIN        cetirizine (ZYRTEC) 10 MG tablet Take 10 mg by mouth daily       escitalopram (LEXAPRO) 10 MG tablet Take 1 tablet (10 mg) by mouth daily 90 tablet 3     Ibuprofen 200 MG capsule Take 200 mg by mouth every 4 hours as needed for fever.       lisinopril (PRINIVIL/ZESTRIL) 10 MG tablet Take 1 tablet (10 mg) by mouth daily 30 tablet 10     Multiple Vitamin (MULTIVITAMINS PO) Take  by mouth daily.       rizatriptan (MAXALT) 10 MG tablet Take 1 tablet (10 mg) by mouth at onset of headache for migraine May repeat in 2 hours. Max 3 tablets/24 hours. 6 tablet 1     Social History     Tobacco Use     Smoking status: Passive Smoke Exposure - Never Smoker     Smokeless tobacco: Never Used     Tobacco comment:  a closet smoker   Substance Use Topics     Alcohol use:  Yes     Comment: 2 glasses of wine per month       ROS:  10 point ROS negative except as listed above      OBJECTIVE:  /70   Pulse 79   Temp 97.4  F (36.3  C)   Resp 20   Wt 63.5 kg (140 lb)   LMP 01/01/2011   SpO2 99%   BMI 24.41 kg/m    GENERAL APPEARANCE: healthy, alert and no distress  EYES: conjunctiva clear  HENT: ear canals and TM's normal.  Nose and mouth without ulcers, erythema or lesions  NECK: supple, nontender, no lymphadenopathy  RESP: lungs clear to auscultation - no rales, rhonchi or wheezes  CV: regular rates and rhythm, normal S1 S2, no murmur noted  ABDOMEN:  soft, nontender, no HSM or masses and bowel sounds normal  NEURO: Normal strength and tone, sensory exam grossly normal,  normal speech and mentation  SKIN: no suspicious lesions or rashes    ASSESSMENT:  (G43.519) Migraine aura, persistent, intractable  (primary encounter diagnosis)  Comment: typical presentation, however increased frequency . Recommending neuro follow up   Plan: rizatriptan (MAXALT) 10 MG tablet, ketorolac         (TORADOL) injection 30 mg, INJECTION         INTRAMUSCULAR OR SUB-Q, NEUROLOGY ADULT         REFERRAL  Red flags and emergent follow up discussed, and understood by patient  Follow up with PCP if symptoms worsen or fail to improve

## 2019-08-13 NOTE — PATIENT INSTRUCTIONS
Patient Education     About Migraine Headaches  What is a migraine headache?  A migraine is a very painful type of headache. It may last a few hours or days. During a migraine, you may have vision problems and feel sick to your stomach.  Migraines are three times more common in women than in men. Once they start, you may get them for the rest of your life. They may occur less often as you age.  What causes it?  We don't know the exact cause, but many things can trigger a migraine. These include:    Stress and anxiety    Lack of food or sleep    Foods and drinks that contain tyramine, such as:  ? Red camden and some beers  ? Aged cheeses (like cheddar or blue cheese)  ? Yeast  ? Aged, dried or cured meats  ? Fermented foods like sauerkraut, soy sauce, miso and bruno chi    Too much light    Chemicals (gasoline, cleaning products, perfume, glue, etc.)    Weather changes    Certain medicines    Hormone changes (in women).  What are symptoms?  Some people can tell when they're about to have a migraine. They may see flashing lights or zigzag lines in front of their eyes. Or they may lose their vision for a short time.  With a migraine, you may:    Feel pain or pulsing on one side of the head. For some people, the entire head hurts.    Be very sensitive to light and sound.    Feel nauseated (sick to your stomach) and vomit (throw up).  How is it treated?  Your care team may suggest medicine to prevent or relieve your symptoms. Once you start having migraines, you may also need medicine to keep them from getting worse.   Take your medicine at the first sign of a migraine. It may take several tries to find a medicine that works for you.   When a migraine comes:    Lie down in a quiet, dark room. Try not to bend over, as this may cause more pain.    Put a cold pack on your head. Try a bag of frozen vegetables, wrapped with a thin cloth.    Drink extra fluids. If you can't drink, suck on ice chips.  How can I prevent  migraines?  It will help to keep a migraine diary. By keeping a diary, you may find the cause of your headaches. Once you know the cause, you can take steps to prevent migraines in the future.  It also helps to live a healthy lifestyle. This means:    Get regular exercise. (If exercise triggers your headaches, tell your doctor.)    Drink plenty of water.    Eat healthy meals at regular times.    Try to go to bed and get up and regular times.    Don't smoke. Avoid second-hand smoke.    Avoid caffeine. Coffee, tea and soda may help a migraine. But if you drink them too often, they can cause migraines.    Find ways to relax, have fun and reduce stress in your life.    Try complementary therapies (yoga, acupuncture, massage, biofeedback, etc.).  When should I call my clinic?  Call your clinic at once if you have new or unusual symptoms, such as:     Pain that gets worse or lasts more than 24 hours.    Slurred speech or problems talking.    A weak arm or leg that you can't move normally.    A fever over 100 F (37.8 C), taken under the tongue.    Stiff neck.    Vomiting (throwing up) for several hours.  For more information about migraines  Contact the [employer] [place] [facility] [employer] [facility] ([facility]) at 1-701.980.3916 or www.headaches.org.

## 2019-08-29 ENCOUNTER — TRANSFERRED RECORDS (OUTPATIENT)
Dept: HEALTH INFORMATION MANAGEMENT | Facility: CLINIC | Age: 64
End: 2019-08-29

## 2019-09-30 ENCOUNTER — HEALTH MAINTENANCE LETTER (OUTPATIENT)
Age: 64
End: 2019-09-30

## 2019-10-10 ENCOUNTER — TRANSFERRED RECORDS (OUTPATIENT)
Dept: HEALTH INFORMATION MANAGEMENT | Facility: CLINIC | Age: 64
End: 2019-10-10

## 2019-12-09 ENCOUNTER — TRANSFERRED RECORDS (OUTPATIENT)
Dept: HEALTH INFORMATION MANAGEMENT | Facility: CLINIC | Age: 64
End: 2019-12-09

## 2019-12-12 DIAGNOSIS — I10 ESSENTIAL HYPERTENSION: ICD-10-CM

## 2019-12-13 RX ORDER — LISINOPRIL 10 MG/1
TABLET ORAL
Qty: 90 TABLET | Refills: 0 | Status: SHIPPED | OUTPATIENT
Start: 2019-12-13 | End: 2020-01-10

## 2019-12-13 NOTE — TELEPHONE ENCOUNTER
"Patient due for fasting office visit- 90 days supply given.  Routing to team to schedule appointment     Brenda LEONE RN  [facility]  [phone]      Requested Prescriptions   Pending Prescriptions Disp Refills     lisinopril (PRINIVIL/ZESTRIL) 10 MG tablet [Pharmacy Med Name: LISINOPRIL 10 MG TABLET] 90 tablet 3     Sig: TAKE 1 TABLET BY MOUTH EVERY DAY       ACE Inhibitors (Including Combos) Protocol Failed - 12/12/2019  4:23 AM        Failed - Recent (12 mo) or future (30 days) visit within the authorizing provider's specialty     Patient has had an office visit with the authorizing provider or a provider within the authorizing providers department within the previous 12 mos or has a future within next 30 days. See \"Patient Info\" tab in inbasket, or \"Choose Columns\" in Meds & Orders section of the refill encounter.              Failed - Normal serum creatinine on file in past 12 months     Recent Labs   Lab Test 11/27/18  1514   CR 0.94             Failed - Normal serum potassium on file in past 12 months     Recent Labs   Lab Test 11/27/18  1514   POTASSIUM 4.0             Passed - Blood pressure under 140/90 in past 12 months     BP Readings from Last 3 Encounters:   08/13/19 110/70   11/27/18 104/68   10/13/17 108/72                 Passed - Medication is active on med list        Passed - Patient is age 18 or older        Passed - No active pregnancy on record        Passed - No positive pregnancy test within past 12 months          "

## 2019-12-20 NOTE — TELEPHONE ENCOUNTER
The pt is aware and scheduled for her upcoming appointment.   Brisa Bhakta on 12/20/2019 at 9:09 AM

## 2020-01-09 NOTE — PROGRESS NOTES
Subjective     Citlali Keenan is a 64 year old female who presents to clinic today for the following health issues:    HPI   Medication Followup of All Medications    Taking Medication as prescribed: yes    Side Effects:  None    Medication Helping Symptoms:  yes     No issues with lisinopril.  Tolerates well, compliant.  No cardiac symptoms.    She has a hx of migraines.  Worsened in frequency with discontinuation of lexapro.  At that time she was seen by neurology.  Treated with nortriptyline.  She did well with this.  She feels she can now discontinue this as well.  Has worked to decrease dosing to every other day with plans to discontinue completely in the near future.  She has not been having migraines.  She does not need any additional maxalt.    She is treated by dermatology for lichen planopilaris.  Recently started plaquenil.    Patient Active Problem List   Diagnosis     CARDIOVASCULAR SCREENING; LDL GOAL LESS THAN 160     Advanced directives, counseling/discussion     HTN (hypertension)     Raynaud's phenomenon     Osteopenia     Menopause     Family history of breast cancer in mother     Moderate episode of recurrent major depressive disorder (H)     Past Surgical History:   Procedure Laterality Date     C ANESTH,BLEPHAROPLASTY  2004     C LIGATE FALLOPIAN TUBE         Social History     Tobacco Use     Smoking status: Passive Smoke Exposure - Never Smoker     Smokeless tobacco: Never Used     Tobacco comment:  a closet smoker   Substance Use Topics     Alcohol use: Yes     Comment: 2 glasses of wine per month     Family History   Problem Relation Age of Onset     Hypertension Mother      Lipids Mother      Breast Cancer Mother         mother diagnose with breast cancer last fall and had lumpectomy at age 83     Skin Cancer Mother         basal cell      Hypertension Father      Hearing Loss Father         father has hearing issue, fitted for hearing aid at age 83     Prostate Cancer Maternal  "Grandfather      Cancer Paternal Grandmother         ovarian?     Hypertension Sister            Reviewed and updated as needed this visit by Provider  Tobacco  Allergies  Meds  Problems  Med Hx  Surg Hx  Fam Hx         Review of Systems   ROS COMP: SEE HPI.      Objective    /80 (BP Location: Right arm, Patient Position: Chair, Cuff Size: Adult Regular)   Pulse 72   Temp 98  F (36.7  C) (Tympanic)   Resp 16   Ht 1.626 m (5' 4\")   Wt 58.7 kg (129 lb 6.4 oz)   LMP 01/01/2011   SpO2 100%   BMI 22.21 kg/m    Body mass index is 22.21 kg/m .  Physical Exam   GENERAL: healthy, alert and no distress  RESP: lungs clear to auscultation - no rales, rhonchi or wheezes  CV: regular rate and rhythm, normal S1 S2, no S3 or S4, no murmur, click or rub, no peripheral edema and peripheral pulses strong  PSYCH: mentation appears normal, affect normal/bright    Diagnostic Test Results:  Labs reviewed in Epic        Assessment & Plan     1. Essential hypertension  Asymptomatic.  Well controlled.  Continue with current plan.  - lisinopril (PRINIVIL/ZESTRIL) 10 MG tablet; Take 1 tablet (10 mg) by mouth daily  Dispense: 90 tablet; Refill: 3       Return in about 8 months (around 9/10/2020) for Physical Exam.    EDWIGE Ibarra Ra [facility] [place]      "

## 2020-01-10 ENCOUNTER — OFFICE VISIT (OUTPATIENT)
Dept: FAMILY MEDICINE | Facility: CLINIC | Age: 65
End: 2020-01-10
Payer: COMMERCIAL

## 2020-01-10 VITALS
HEIGHT: 64 IN | WEIGHT: 129.4 LBS | HEART RATE: 72 BPM | DIASTOLIC BLOOD PRESSURE: 80 MMHG | TEMPERATURE: 98 F | SYSTOLIC BLOOD PRESSURE: 120 MMHG | OXYGEN SATURATION: 100 % | BODY MASS INDEX: 22.09 KG/M2 | RESPIRATION RATE: 16 BRPM

## 2020-01-10 DIAGNOSIS — I10 ESSENTIAL HYPERTENSION: ICD-10-CM

## 2020-01-10 PROCEDURE — 99213 OFFICE O/P EST LOW 20 MIN: CPT | Performed by: NURSE PRACTITIONER

## 2020-01-10 RX ORDER — HYDROXYCHLOROQUINE SULFATE 200 MG/1
200 TABLET, FILM COATED ORAL DAILY
COMMUNITY
Start: 2019-12-26

## 2020-01-10 RX ORDER — NORTRIPTYLINE HCL 10 MG
CAPSULE ORAL
COMMUNITY
Start: 2020-01-05 | End: 2020-11-20

## 2020-01-10 RX ORDER — NAPROXEN 500 MG/1
TABLET ORAL
COMMUNITY
Start: 2019-08-30

## 2020-01-10 RX ORDER — LISINOPRIL 10 MG/1
10 TABLET ORAL DAILY
Qty: 90 TABLET | Refills: 3 | Status: SHIPPED | OUTPATIENT
Start: 2020-01-10 | End: 2021-01-20

## 2020-01-10 ASSESSMENT — PATIENT HEALTH QUESTIONNAIRE - PHQ9
SUM OF ALL RESPONSES TO PHQ QUESTIONS 1-9: 1
5. POOR APPETITE OR OVEREATING: NOT AT ALL

## 2020-01-10 ASSESSMENT — ANXIETY QUESTIONNAIRES
3. WORRYING TOO MUCH ABOUT DIFFERENT THINGS: NOT AT ALL
1. FEELING NERVOUS, ANXIOUS, OR ON EDGE: NOT AT ALL
5. BEING SO RESTLESS THAT IT IS HARD TO SIT STILL: NOT AT ALL
GAD7 TOTAL SCORE: 0
2. NOT BEING ABLE TO STOP OR CONTROL WORRYING: NOT AT ALL
7. FEELING AFRAID AS IF SOMETHING AWFUL MIGHT HAPPEN: NOT AT ALL
IF YOU CHECKED OFF ANY PROBLEMS ON THIS QUESTIONNAIRE, HOW DIFFICULT HAVE THESE PROBLEMS MADE IT FOR YOU TO DO YOUR WORK, TAKE CARE OF THINGS AT HOME, OR GET ALONG WITH OTHER PEOPLE: NOT DIFFICULT AT ALL
6. BECOMING EASILY ANNOYED OR IRRITABLE: NOT AT ALL

## 2020-01-10 ASSESSMENT — MIFFLIN-ST. JEOR: SCORE: 1126.95

## 2020-01-11 ASSESSMENT — ANXIETY QUESTIONNAIRES: GAD7 TOTAL SCORE: 0

## 2020-03-22 ENCOUNTER — HEALTH MAINTENANCE LETTER (OUTPATIENT)
Age: 65
End: 2020-03-22

## 2020-11-19 PROBLEM — M54.2 NECK PAIN: Status: ACTIVE | Noted: 2019-08-29

## 2020-11-19 PROBLEM — L65.9 ALOPECIA: Status: ACTIVE | Noted: 2019-12-09

## 2020-11-19 NOTE — PATIENT INSTRUCTIONS
Adequate calcium is necessary for good health, and not just because it's a major component of our bones. It also plays a vital role in keeping our organs and skeletal muscles working properly.    Calcium -- A rough method of estimating dietary calcium intake is to multiply the number of dairy servings consumed per day by 300 mg. One serving is 8 oz (240 mL) of milk or yogurt or 1 oz (29 g) of hard cheese. Cottage cheese and ice cream contain approximately 150 mg of calcium per 4 oz (113 g). Other foods in a well-balanced diet (dark green vegetables, some nuts, breads, and cereals) supply an average of 100 to 200 mg of calcium daily. Some cereals, soy products, and fruit juices are fortified with up to 1000 mg of calcium.    Recommended amount of daily calcium: For pre-menopausal women 25-50 years old and post-menopausal women on estrogen replacement therapy: 1,000-1,200 milligrams of calcium per day. 1,500 milligrams of calcium per day is recommended for pregnant or lactating women.    Formulations:  The most widely available calcium supplements are calcium carbonate and Calcium citrate. Calcium carbonate is cheapest and therefore often a good first choice. However, there are some limitations to its use compared with calcium citrate:  ?Calcium carbonate absorption is better when taken with meals; in comparison, calcium citrate as well absorbed in the fasting state and is equally absorbed compared with calcium carbonate taken with a meal. This may be particularly important in patients with achlorhydria. Thus, it seems prudent to take calcium carbonate with meals since it is often hard to know who has achlorhydria.  ?Calcium carbonate is also poorly absorbed in patients taking proton pump inhibitors (PPIs) or H2 blockers. We usually recommend calcium citrate as a first-line calcium supplement in these patients.    Vitamin D3 2000 international unit(s) daily.     Preventive Health Recommendations  Female Ages 50 -  64    Yearly exam: See your health care provider every year in order to  o Review health changes.   o Discuss preventive care.    o Review your medicines if your doctor has prescribed any.      Get a Pap test every three years (unless you have an abnormal result and your provider advises testing more often).    If you get Pap tests with HPV test, you only need to test every 5 years, unless you have an abnormal result.     You do not need a Pap test if your uterus was removed (hysterectomy) and you have not had cancer.    You should be tested each year for STDs (sexually transmitted diseases) if you're at risk.     Have a mammogram every 1 to 2 years.    Have a colonoscopy at age 50, or have a yearly FIT test (stool test). These exams screen for colon cancer.      Have a cholesterol test every 5 years, or more often if advised.    Have a diabetes test (fasting glucose) every three years. If you are at risk for diabetes, you should have this test more often.     If you are at risk for osteoporosis (brittle bone disease), think about having a bone density scan (DEXA).    Shots: Get a flu shot each year. Get a tetanus shot every 10 years.    Nutrition:     Eat at least 5 servings of fruits and vegetables each day.    Eat whole-grain bread, whole-wheat pasta and brown rice instead of white grains and rice.    Get adequate Calcium and Vitamin D.     Lifestyle    Exercise at least 150 minutes a week (30 minutes a day, 5 days a week). This will help you control your weight and prevent disease.    Limit alcohol to one drink per day.    No smoking.     Wear sunscreen to prevent skin cancer.     See your dentist every six months for an exam and cleaning.    See your eye doctor every 1 to 2 years.

## 2020-11-19 NOTE — PROGRESS NOTES
SUBJECTIVE:   CC: Citlali Keenan is an 65 year old woman who presents for preventive health visit.     Patient has been advised of split billing requirements and indicates understanding: Yes   Healthy Habits:     Getting at least 3 servings of Calcium per day:  Yes    Bi-annual eye exam:  NO    Dental care twice a year:  Yes    Sleep apnea or symptoms of sleep apnea:  None    Diet:  Regular (no restrictions)    Frequency of exercise:  2-3 days/week    Duration of exercise:  15-30 minutes    Taking medications regularly:  Yes    Medication side effects:  None    PHQ-2 Total Score: 0    Additional concerns today:  Yes (rash upper torso right side )       Constant runny nose and watery eyes, some eye redness. No eye itching. History of allergies. Flonase causes bloody noses. On zyrtec 10 mg daily. Antihistamine eye drops ineffective.    Migraines well controlled. Usually controlled with just naproxen. Also has prescription for rizatriptan as needed.    Depression well controlled.     Follows with derm through [facility] for lichen planopilaris and alopecia, on hydroxychloroquine 200 mg daily and regular kenalog injections. Also has dermatographism with pruritis, unclear etiology. Managed with fluocinonide and antihistamines.     Hypertension well managed on lisinopril 10 mg daily. No concerns.       Today's PHQ-2 Score:   PHQ-2 ( 1999 Pfizer) 11/25/2018   Q1: Little interest or pleasure in doing things 0   Q2: Feeling down, depressed or hopeless 0   PHQ-2 Score 0   Q1: Little interest or pleasure in doing things Not at all   Q2: Feeling down, depressed or hopeless Not at all   PHQ-2 Score 0     Abuse: Current or Past (Physical, Sexual or Emotional) - No  Do you feel safe in your environment? Yes    Social History     Tobacco Use     Smoking status: Passive Smoke Exposure - Never Smoker     Smokeless tobacco: Never Used     Tobacco comment:  a closet smoker   Substance Use Topics     Alcohol use: Yes      Comment: 2 glasses of wine per month     Alcohol Use 11/25/2018   Prescreen: >3 drinks/day or >7 drinks/week? No   Prescreen: >3 drinks/day or >7 drinks/week? -     Reviewed orders with patient.  Reviewed health maintenance and updated orders accordingly - Yes  BP Readings from Last 3 Encounters:   11/20/20 126/83   01/10/20 120/80   08/13/19 110/70    Wt Readings from Last 3 Encounters:   11/20/20 55.7 kg (122 lb 14.4 oz)   01/10/20 58.7 kg (129 lb 6.4 oz)   08/13/19 63.5 kg (140 lb)            Mammogram Screening: Patient over age 50, mutual decision to screen reflected in health maintenance. Pertinent mammograms are reviewed under the imaging tab.    History of abnormal Pap smear: NO - age 30-65 PAP every 5 years with negative HPV co-testing recommended  PAP / HPV Latest Ref Rng & Units 9/8/2015 9/7/2011 2/15/2006   PAP - NIL NIL NIL   HPV 16 DNA NEG Negative - -   HPV 18 DNA NEG Negative - -   OTHER HR HPV NEG Negative - -     Reviewed and updated as needed this visit by clinical staff               Reviewed and updated as needed this visit by Provider    Past Medical History:   Diagnosis Date     Alopecia      DEPRESSIVE DISORDER NEC 04/13/2006     Hypertension      Lichen planopilaris     In remission, follows with derm     Migraine 03/13/2012     Migraine without aura, with intractable migraine, so stated, without mention of status migrainosus      Mild major depression (H) 08/10/2011     Past Surgical History:   Procedure Laterality Date     C ANESTH,BLEPHAROPLASTY  2004     C LIGATE FALLOPIAN TUBE       Family History   Problem Relation Age of Onset     Hypertension Mother      Lipids Mother      Breast Cancer Mother         mother diagnose with breast cancer last fall and had lumpectomy at age 83     Skin Cancer Mother         basal cell      Hypertension Father      Hearing Loss Father         father has hearing issue, fitted for hearing aid at age 83     Dementia Father      Prostate Cancer Maternal  Grandfather      Cancer Paternal Grandmother         ovarian?     Hypertension Sister      Social History     Socioeconomic History     Marital status:      Spouse name: Ryan Keenan     Number of children: 3     Years of education: 16     Highest education level: Not on file   Occupational History     Occupation: [employer]   Social Needs     Financial resource strain: Not on file     Food insecurity     Worry: Not on file     Inability: Not on file     Transportation needs     Medical: Not on file     Non-medical: Not on file   Tobacco Use     Smoking status: Passive Smoke Exposure - Never Smoker     Smokeless tobacco: Never Used     Tobacco comment:  a closet smoker   Substance and Sexual Activity     Alcohol use: Yes     Comment: 1 glass amaretto per month     Drug use: No     Sexual activity: Yes     Partners: Male     Birth control/protection: Surgical   Lifestyle     Physical activity     Days per week: Not on file     Minutes per session: Not on file     Stress: Not on file   Relationships     Social connections     Talks on phone: Not on file     Gets together: Not on file     Attends [employer] service: Not on file     Active member of club or organization: Not on file     Attends meetings of clubs or organizations: Not on file     Relationship status: Not on file     Intimate partner violence     Fear of current or ex partner: Not on file     Emotionally abused: Not on file     Physically abused: Not on file     Forced sexual activity: Not on file   Other Topics Concern     Parent/sibling w/ CABG, MI or angioplasty before 65F 55M? No   Social History Narrative     Not on file     Current Outpatient Medications   Medication     Vitamin D, Cholecalciferol, 25 MCG (1000 UT) TABS     BIOTIN     cetirizine (ZYRTEC) 10 MG tablet     hydroxychloroquine (PLAQUENIL) 200 MG tablet     lisinopril (PRINIVIL/ZESTRIL) 10 MG tablet     naproxen (NAPROSYN) 500 MG tablet     rizatriptan (MAXALT) 10  "MG tablet     No current facility-administered medications for this visit.         Allergies   Allergen Reactions     No Known Allergies      Seasonal Allergies                      Review of Systems   Constitutional: Negative for chills and fever.   HENT: Negative for congestion, ear pain, hearing loss and sore throat.    Eyes: Negative for pain and visual disturbance.   Respiratory: Negative for cough and shortness of breath.    Cardiovascular: Negative for chest pain, palpitations and peripheral edema.   Gastrointestinal: Negative for abdominal pain, constipation, diarrhea, heartburn, hematochezia and nausea.   Breasts:  Negative for tenderness, breast mass and discharge.   Genitourinary: Negative for dysuria, frequency, genital sores, hematuria, pelvic pain, urgency, vaginal bleeding and vaginal discharge.   Musculoskeletal: Negative for arthralgias, joint swelling and myalgias.   Skin: Positive for rash.   Neurological: Negative for dizziness, weakness, headaches and paresthesias.   Psychiatric/Behavioral: Negative for mood changes. The patient is not nervous/anxious.         OBJECTIVE:   /83   Pulse 72   Temp 97.8  F (36.6  C) (Temporal)   Ht 1.626 m (5' 4\")   Wt 55.7 kg (122 lb 14.4 oz)   LMP 01/01/2011   SpO2 100%   BMI 21.10 kg/m    Physical Exam  Constitutional: appears to be in no acute distress, comfortable, pleasant.   Eyes: anicteric, conjunctiva clear without drainage or erythema. GEE.   Ears, Nose and Throat: tympanic membranes gray with LR,  nose without nasal discharge. OP: no erythema to posterior pharynx, negative post nasal drainage, tonsils +1 no erythema or exudate.  Neck: supple, thyroid palpable,not enlarged, no nodules   Breast: No nipple abnormality or discharge, no dominant masses, tenderness to palpation, axillary, supraclavicular, or infraclavicular adenopathy. Light pink flat rash noted to lateral aspect of right breast.   Cardiovascular: regular rate and rhythm, normal " S1 and S2, no murmurs, rubs or gallops, peripheral pulses full and symmetric; negative peripheral edema   Respiratory: Air entry throughout. Breathing pattern unlabored without the use of accessory muscles. Clear to auscultation A and P, no wheezes or crackles, normal breath sounds.    Gastrointestinal: rounded, soft. Positive bowel sounds x4, nontender, no masses.   Genitourinary: external genitalia is without lesions. Introitus is normal, vaginal walls pink and moist without lesions or evidence of trauma. There is no cervical motion tenderness and the adnexa are without masses. There is no abnormal discharge from the cervix. Cervix is pink without polyps or lesions.  Musculoskeletal: full range of motion, no edema.   Skin: pink, turgor smooth and elastic. Negative for lesions or dryness.  Neurological: normal gait, no tremor.   Psychological: appropriate mood and affect.   Lymphatic: no cervical, axillary, supraclavicular, or infraclavicular lymphadenopathy.      Diagnostic Test Results:  Labs reviewed in Epic    ASSESSMENT/PLAN:   1. Routine general medical examination at a health care facility  Age appropriate screening and preventative care have been addressed today. Vaccinations have been reviewed. Patient has been advised to follow a balanced diet with reduction in cholesterol, and reasonable portion sizes. They have been advised to undertake routine aerobic activity and they were counseled on healthy weight maintenance. Recommend annual vision exams as well as biannual dental exams. They will follow up for annual physical again in one year.   Plan:   - REVIEW OF HEALTH MAINTENANCE PROTOCOL ORDERS   - Mammogram utd   - Pap done today, if normal, cervical cancer screening is considered complete   - DEXA utd, repeat in 2021   - Colonoscopy utd, due in 2022   - Due for Shingrix, she will check on insurance coverage through the pharmacy    2. Screening for malignant neoplasm of cervix  No history of abnormals.  "  Plan:   - Pap imaged thin layer screen with HPV - recommended age 30 - 65 years (select HPV order below)   - HPV High Risk Types DNA Cervical    3. Essential hypertension  Well controlled, continue lisinopril at current dose. Recheck labs.  Plan:   - Albumin Random Urine Quantitative with Creat Ratio   - Comprehensive metabolic panel (BMP + Alb, Alk Phos, ALT, AST, Total. Bili, TP)    4. Screening for hyperlipidemia  Last checked 5 years ago, normal. Repeat lipids today.   Plan:   - Lipid panel reflex to direct LDL Fasting    5. Non-seasonal allergic rhinitis, unspecified trigger  Continue zyrtec 10 mg daily. Tried flonase in the past but had nosebleeds. Antihistamine eye drops ineffective. Offered referral to allergy but declined at this time. Will let me know if she changes her mind.     6. Alopecia  7. Lichen planopilaris  8. Dermatographism  Follows with derm through [facility] for lichen planopilaris and alopecia, on hydroxychloroquine 200 mg daily and regular kenalog injections. Also has dermatographism with pruritis, unclear etiology. Managed with fluocinonide and antihistamines.       Follow-up: Lab results pending, will follow-up as indicated after reviewing results. Return to clinic in 1 year for annual wellness exam.       COUNSELING:  Reviewed preventive health counseling, as reflected in patient instructions  Special attention given to:        Regular exercise       Healthy diet/nutrition       Vision screening       Immunizations       Alcohol Use       Osteoporosis prevention/bone health       Colon cancer screening    Estimated body mass index is 21.1 kg/m  as calculated from the following:    Height as of this encounter: 1.626 m (5' 4\").    Weight as of this encounter: 55.7 kg (122 lb 14.4 oz).      She reports that she is a non-smoker but has been exposed to tobacco smoke. She has never used smokeless tobacco.      Counseling Resources:  ATP IV Guidelines  Pooled Cohorts Equation " Calculator  Breast Cancer Risk Calculator  BRCA-Related Cancer Risk Assessment: FHS-7 Tool  FRAX Risk Assessment  ICSI Preventive Guidelines  Dietary Guidelines for Americans, 2010  USDA's MyPlate  ASA Prophylaxis  Lung CA Screening    EDWIGE Evans CNP  [facility]

## 2020-11-20 ENCOUNTER — OFFICE VISIT (OUTPATIENT)
Dept: PEDIATRICS | Facility: CLINIC | Age: 65
End: 2020-11-20
Payer: COMMERCIAL

## 2020-11-20 ENCOUNTER — ANCILLARY PROCEDURE (OUTPATIENT)
Dept: MAMMOGRAPHY | Facility: CLINIC | Age: 65
End: 2020-11-20
Payer: COMMERCIAL

## 2020-11-20 VITALS
HEIGHT: 64 IN | DIASTOLIC BLOOD PRESSURE: 83 MMHG | HEART RATE: 72 BPM | TEMPERATURE: 97.8 F | OXYGEN SATURATION: 100 % | BODY MASS INDEX: 20.98 KG/M2 | SYSTOLIC BLOOD PRESSURE: 126 MMHG | WEIGHT: 122.9 LBS

## 2020-11-20 DIAGNOSIS — I10 ESSENTIAL HYPERTENSION: ICD-10-CM

## 2020-11-20 DIAGNOSIS — Z00.00 ROUTINE GENERAL MEDICAL EXAMINATION AT A HEALTH CARE FACILITY: Primary | ICD-10-CM

## 2020-11-20 DIAGNOSIS — L50.3 DERMATOGRAPHISM: ICD-10-CM

## 2020-11-20 DIAGNOSIS — Z13.220 SCREENING FOR HYPERLIPIDEMIA: ICD-10-CM

## 2020-11-20 DIAGNOSIS — Z12.4 SCREENING FOR MALIGNANT NEOPLASM OF CERVIX: ICD-10-CM

## 2020-11-20 DIAGNOSIS — L65.9 ALOPECIA: ICD-10-CM

## 2020-11-20 DIAGNOSIS — Z12.31 VISIT FOR SCREENING MAMMOGRAM: ICD-10-CM

## 2020-11-20 DIAGNOSIS — J30.89 NON-SEASONAL ALLERGIC RHINITIS, UNSPECIFIED TRIGGER: ICD-10-CM

## 2020-11-20 DIAGNOSIS — L66.10 LICHEN PLANOPILARIS: ICD-10-CM

## 2020-11-20 LAB
CREAT UR-MCNC: 222 MG/DL
MICROALBUMIN UR-MCNC: 14 MG/L
MICROALBUMIN/CREAT UR: 6.22 MG/G CR (ref 0–25)

## 2020-11-20 PROCEDURE — 87624 HPV HI-RISK TYP POOLED RSLT: CPT | Performed by: NURSE PRACTITIONER

## 2020-11-20 PROCEDURE — 82043 UR ALBUMIN QUANTITATIVE: CPT | Performed by: NURSE PRACTITIONER

## 2020-11-20 PROCEDURE — 36415 COLL VENOUS BLD VENIPUNCTURE: CPT | Performed by: NURSE PRACTITIONER

## 2020-11-20 PROCEDURE — 77067 SCR MAMMO BI INCL CAD: CPT | Performed by: RADIOLOGY

## 2020-11-20 PROCEDURE — 80061 LIPID PANEL: CPT | Performed by: NURSE PRACTITIONER

## 2020-11-20 PROCEDURE — 80053 COMPREHEN METABOLIC PANEL: CPT | Performed by: NURSE PRACTITIONER

## 2020-11-20 PROCEDURE — 99396 PREV VISIT EST AGE 40-64: CPT | Performed by: NURSE PRACTITIONER

## 2020-11-20 PROCEDURE — G0123 SCREEN CERV/VAG THIN LAYER: HCPCS | Performed by: NURSE PRACTITIONER

## 2020-11-20 RX ORDER — MULTIVIT-MIN/IRON/FOLIC ACID/K 18-600-40
1 CAPSULE ORAL DAILY
COMMUNITY

## 2020-11-20 SDOH — HEALTH STABILITY: MENTAL HEALTH: HOW OFTEN DO YOU HAVE 6 OR MORE DRINKS ON ONE OCCASION?: NOT ASKED

## 2020-11-20 SDOH — HEALTH STABILITY: MENTAL HEALTH: HOW MANY STANDARD DRINKS CONTAINING ALCOHOL DO YOU HAVE ON A TYPICAL DAY?: NOT ASKED

## 2020-11-20 SDOH — HEALTH STABILITY: MENTAL HEALTH: HOW OFTEN DO YOU HAVE A DRINK CONTAINING ALCOHOL?: NOT ASKED

## 2020-11-20 ASSESSMENT — ENCOUNTER SYMPTOMS
HEARTBURN: 0
HEADACHES: 0
HEMATOCHEZIA: 0
CHILLS: 0
COUGH: 0
EYE PAIN: 0
NERVOUS/ANXIOUS: 0
FEVER: 0
DYSURIA: 0
JOINT SWELLING: 0
HEMATURIA: 0
SORE THROAT: 0
ABDOMINAL PAIN: 0
DIZZINESS: 0
MYALGIAS: 0
FREQUENCY: 0
PALPITATIONS: 0
DIARRHEA: 0
WEAKNESS: 0
PARESTHESIAS: 0
CONSTIPATION: 0
SHORTNESS OF BREATH: 0
NAUSEA: 0
BREAST MASS: 0
ARTHRALGIAS: 0

## 2020-11-20 ASSESSMENT — MIFFLIN-ST. JEOR: SCORE: 1092.47

## 2020-11-21 LAB
ALBUMIN SERPL-MCNC: 3.9 G/DL (ref 3.4–5)
ALP SERPL-CCNC: 73 U/L (ref 40–150)
ALT SERPL W P-5'-P-CCNC: 30 U/L (ref 0–50)
ANION GAP SERPL CALCULATED.3IONS-SCNC: 2 MMOL/L (ref 3–14)
AST SERPL W P-5'-P-CCNC: 26 U/L (ref 0–45)
BILIRUB SERPL-MCNC: 0.5 MG/DL (ref 0.2–1.3)
BUN SERPL-MCNC: 18 MG/DL (ref 7–30)
CALCIUM SERPL-MCNC: 9.1 MG/DL (ref 8.5–10.1)
CHLORIDE SERPL-SCNC: 110 MMOL/L (ref 94–109)
CHOLEST SERPL-MCNC: 220 MG/DL
CO2 SERPL-SCNC: 29 MMOL/L (ref 20–32)
CREAT SERPL-MCNC: 0.83 MG/DL (ref 0.52–1.04)
GFR SERPL CREATININE-BSD FRML MDRD: 74 ML/MIN/{1.73_M2}
GLUCOSE SERPL-MCNC: 70 MG/DL (ref 70–99)
HDLC SERPL-MCNC: 77 MG/DL
LDLC SERPL CALC-MCNC: 133 MG/DL
NONHDLC SERPL-MCNC: 143 MG/DL
POTASSIUM SERPL-SCNC: 3.7 MMOL/L (ref 3.4–5.3)
PROT SERPL-MCNC: 7 G/DL (ref 6.8–8.8)
SODIUM SERPL-SCNC: 141 MMOL/L (ref 133–144)
TRIGL SERPL-MCNC: 52 MG/DL

## 2020-11-25 LAB
COPATH REPORT: NORMAL
PAP: NORMAL

## 2020-11-27 LAB
FINAL DIAGNOSIS: ABNORMAL
HPV HR 12 DNA CVX QL NAA+PROBE: POSITIVE
HPV16 DNA SPEC QL NAA+PROBE: NEGATIVE
HPV18 DNA SPEC QL NAA+PROBE: NEGATIVE
SPECIMEN DESCRIPTION: ABNORMAL
SPECIMEN SOURCE CVX/VAG CYTO: ABNORMAL

## 2020-11-30 ENCOUNTER — PATIENT OUTREACH (OUTPATIENT)
Dept: PEDIATRICS | Facility: CLINIC | Age: 65
End: 2020-11-30

## 2021-01-15 DIAGNOSIS — I10 ESSENTIAL HYPERTENSION: ICD-10-CM

## 2021-01-19 NOTE — TELEPHONE ENCOUNTER
Patient is due for an appt. Will forward to TC's to assist with scheduling.     Sophia Martinez RN on 1/19/2021 at 2:21 PM

## 2021-01-20 RX ORDER — LISINOPRIL 10 MG/1
TABLET ORAL
Qty: 90 TABLET | Refills: 2 | Status: SHIPPED | OUTPATIENT
Start: 2021-01-20 | End: 2021-09-22

## 2021-01-20 NOTE — TELEPHONE ENCOUNTER
Patient was seen by Gin Schaffer on 11/20/2020 for physical exam.    Prescription approved per [facility] Refill Protocol.  Sera Ghotra RN

## 2021-04-09 ENCOUNTER — TRANSFERRED RECORDS (OUTPATIENT)
Dept: HEALTH INFORMATION MANAGEMENT | Facility: CLINIC | Age: 66
End: 2021-04-09

## 2021-04-26 ENCOUNTER — TRANSCRIBE ORDERS (OUTPATIENT)
Dept: OTHER | Age: 66
End: 2021-04-26

## 2021-04-26 DIAGNOSIS — L65.9 HAIR LOSS: Primary | ICD-10-CM

## 2021-05-07 ENCOUNTER — TRANSFERRED RECORDS (OUTPATIENT)
Dept: HEALTH INFORMATION MANAGEMENT | Facility: CLINIC | Age: 66
End: 2021-05-07

## 2021-05-21 ENCOUNTER — TRANSCRIBE ORDERS (OUTPATIENT)
Dept: OTHER | Age: 66
End: 2021-05-21

## 2021-05-21 DIAGNOSIS — L66.10 LICHEN PLANOPILARIS: Primary | ICD-10-CM

## 2021-10-24 ENCOUNTER — HEALTH MAINTENANCE LETTER (OUTPATIENT)
Age: 66
End: 2021-10-24

## 2021-11-04 ENCOUNTER — PATIENT OUTREACH (OUTPATIENT)
Dept: PEDIATRICS | Facility: CLINIC | Age: 66
End: 2021-11-04

## 2021-11-04 ENCOUNTER — MYC MEDICAL ADVICE (OUTPATIENT)
Dept: OBGYN | Facility: CLINIC | Age: 66
End: 2021-11-04

## 2021-11-04 DIAGNOSIS — R87.810 CERVICAL HIGH RISK HPV (HUMAN PAPILLOMAVIRUS) TEST POSITIVE: ICD-10-CM

## 2021-11-05 NOTE — TELEPHONE ENCOUNTER
11/4/21 Reminder Carltonhart. Patient reply: I moved over to the [facility]- with Dr. Radha Daly and have my physical scheduled in January 2022. Tracking ceased due to transfer

## 2022-01-18 DIAGNOSIS — I10 ESSENTIAL HYPERTENSION: ICD-10-CM

## 2022-01-18 RX ORDER — LISINOPRIL 10 MG/1
10 TABLET ORAL DAILY
Qty: 90 TABLET | Refills: 0 | Status: SHIPPED | OUTPATIENT
Start: 2022-01-18

## 2022-01-18 NOTE — LETTER
February 4, 2022      Citlali Keenan  [address]  [place] [facility] [address]        Dear Citlali,       We care about your health and have reviewed your health plan including your medical conditions, medications, and lab results.  Based on this review, it is recommended that you follow up regarding the following health topic(s):  -Wellness (Physical) Visit     We recommend you take the following action(s):  -schedule a WELLNESS (Physical) APPOINTMENT.  We will perform the following labs: A1c, Lipids (fasting cholesterol - nothing to eat except water and/or meds for 8-10 hours), CMP(complete metabolic panel) and TSH (thyroid test).     Please call us at the [facility] - [phone] (or use [email or web address]) to address the above recommendations.     Thank you for trusting [facility] and we appreciate the opportunity to serve you.  We look forward to supporting your healthcare needs in the future.    Healthy Regards,    Your Health Care Team  [facility]

## 2022-01-19 ENCOUNTER — MYC MEDICAL ADVICE (OUTPATIENT)
Dept: PEDIATRICS | Facility: CLINIC | Age: 67
End: 2022-01-19
Payer: COMMERCIAL

## 2022-01-19 NOTE — TELEPHONE ENCOUNTER
Medication is being filled for 1 time refill only due to:  Patient needs to be seen because it has been more than one year since last visit.     Also needs labs.     Routing to MA/TC pool. The Pt is due for a visit with PCP. Please call and help them schedule.    RN will issue a 90 day supply. No further refills until the Pt is seen.       Marietta Morris RN   [facility]  -- Triage Nurse

## 2022-02-04 NOTE — TELEPHONE ENCOUNTER
Sent letter to patient in final attempt to reach them about scheduling appointment.     Closing encounter.     Ara Wynne, RYANN

## 2022-02-13 ENCOUNTER — HEALTH MAINTENANCE LETTER (OUTPATIENT)
Age: 67
End: 2022-02-13

## 2022-02-14 ENCOUNTER — MYC MEDICAL ADVICE (OUTPATIENT)
Dept: DERMATOLOGY | Facility: CLINIC | Age: 67
End: 2022-02-14

## 2022-02-14 ENCOUNTER — VIRTUAL VISIT (OUTPATIENT)
Dept: DERMATOLOGY | Facility: CLINIC | Age: 67
End: 2022-02-14
Attending: DERMATOLOGY
Payer: COMMERCIAL

## 2022-02-14 DIAGNOSIS — L66.10 LICHEN PLANOPILARIS: Primary | ICD-10-CM

## 2022-02-14 DIAGNOSIS — R21 RASH: ICD-10-CM

## 2022-02-14 PROCEDURE — 99203 OFFICE O/P NEW LOW 30 MIN: CPT | Mod: 95 | Performed by: DERMATOLOGY

## 2022-02-14 RX ORDER — ESCITALOPRAM OXALATE 5 MG/1
TABLET ORAL
COMMUNITY
Start: 2022-01-06

## 2022-02-14 RX ORDER — DUTASTERIDE 0.5 MG/1
1 CAPSULE, LIQUID FILLED ORAL DAILY
COMMUNITY
Start: 2022-01-23

## 2022-02-14 RX ORDER — DOXYCYCLINE 100 MG/1
100 CAPSULE ORAL DAILY
COMMUNITY
Start: 2022-01-23

## 2022-02-14 ASSESSMENT — PAIN SCALES - GENERAL: PAINLEVEL: NO PAIN (0)

## 2022-02-14 NOTE — LETTER
2/14/2022       RE: Citlali Keenan  [address]  [place] [address]     Dear Colleague,    Thank you for referring your patient, Citlali Keenan, to the [facility]MAT[facility] [place] at [facility]. Please see a copy of my visit note below.    [facility] Dermatology Note  Encounter Date: Feb 14, 2022  Telephone ([phone]). Location of teledermatologist: [facility] [place]. Start time: 1:30 PM. End time: 1:45 PM.    Dermatology Problem List:  1. Lichen planopilaris  - Bx on 12/9/19 consistent with LPP  - Rx: ILK, hydroxychloroquine 200 mg once daily, doxycycline 100 mg daily, and dutasteride 0.5 mg   ____________________________________________    Assessment & Plan:     # Lichen plano pilaris   Patient seems to have done a great job at controlling inflammatory component of the disease with Dr. Triana of [facility]. At this point, we recommend focusing on hair growth with low dose oral minoxidil.    - Continue hydroxychloroquine 200 mg once daily, doxycycline 100 mg daily, and dutasteride 0.5 mg per Dr. Triana  - Consider low dose oral minoxidil  - Consider these labs:  CMP, CBC with diff, Vit D, zinc, DHEAS, testosterone    # Non specific rash  Follow-up with Dr. Triana as scheduled on 2/25/22 for potential biopsy.    # Bruising of hands   Follow-up with PCP or Dr. Triana for further evaluation.    Procedures Performed:    None    Follow-up: prn for new or changing concerns    Staff and Scribe:     Scribe Disclosure:  I, Karli Abreu, am serving as a scribe to document services personally performed by Zenaida Freire MD based on data collection and the provider's statements to me.     Provider Disclosure:   The documentation recorded by the scribe accurately reflects the services I personally performed and the decisions made by me.    MD Claudia Weir  and Chair  Department of Dermatology  [facility]: Phone: [phone], Fax:[phone]  [facility]: Phone: [phone], Fax: [phone]      ____________________________________________    CC: Hair Loss (Citlali would like to discuss hair loss and a rash)    HPI:  Ms. Citlali Keenan is a(n) 67 year old female who presents today as a new patient for LPP. The patient has been following with Dr. Triana for treatment of her atopic dermatitis and LPP. Per patient, this is biopsy proven LPP. For the LPP, she has been taking hydroxychloroquine 200 mg once daily, doxycycline 100 mg daily, and dutasteride 0.5 mg. She shampoos once every 5 days with [employer] and uses the same conditioner. She has also been receiving ILK injections. Patient reports that all of these treatments have been particularly helpful at decreasing the inflammation and erythema. However, she is concerned that tapering off the above medications will hurt her progress. She is taking a calcium and D3 supplement. No excess body area.     Additionally, the patient reports having an intermittent rash that used to come mainly in summer. However, more recently, she developed the rash on her side. She has been using Zyrtec BID, hydroxyzine prn at night, and fluocinonide. Patient is otherwise feeling well, without additional skin concerns.    Labs Reviewed:  N/A    Physical Exam:  Vitals: LMP 01/01/2011   SKIN: Teledermatology photos were reviewed; image quality and interpretability: acceptable. Image date: 2/14/22.  - Decreased hair density on bilateral pariatal regions.   - Mid frontal scalp focal patchy alopecia.   - No significant erythema, scale, or folliculitis.   - Trunk with large erythematous patch.  - Ecchymotic lesions on hands.  - No other lesions of concern on areas examined.     Medications:  Current Outpatient Medications   Medication      BIOTIN     cetirizine (ZYRTEC) 10 MG tablet     doxycycline monohydrate (MONODOX) 100 MG capsule     dutasteride (AVODART) 0.5 MG capsule     escitalopram (LEXAPRO) 5 MG tablet     hydroxychloroquine (PLAQUENIL) 200 MG tablet     lisinopril (ZESTRIL) 10 MG tablet     naproxen (NAPROSYN) 500 MG tablet     rizatriptan (MAXALT) 10 MG tablet     Vitamin D, Cholecalciferol, 25 MCG (1000 UT) TABS     No current facility-administered medications for this visit.      Past Medical/Surgical History:   Patient Active Problem List   Diagnosis     CARDIOVASCULAR SCREENING; LDL GOAL LESS THAN 160     Advanced directives, counseling/discussion     HTN (hypertension)     Raynaud's phenomenon     Osteopenia     Menopause     Family history of breast cancer in mother     Moderate episode of recurrent major depressive disorder (H)     Alopecia     Neck pain     Cervical high risk HPV (human papillomavirus) test positive     Past Medical History:   Diagnosis Date     Alopecia      Cervical high risk HPV (human papillomavirus) test positive 11/20/2020     DEPRESSIVE DISORDER NEC 04/13/2006     Hypertension      Lichen planopilaris     In remission, follows with derm     Migraine 03/13/2012     Migraine without aura, with intractable migraine, so stated, without mention of status migrainosus      Mild major depression (H) 08/10/2011     Non-seasonal allergic rhinitis        CC Estephanie Colon MD  [address] [place] [address]

## 2022-02-14 NOTE — PATIENT INSTRUCTIONS
[facility] Dermatology Visit    Consider low dose oral minoxidil   Recommended labs: CMP, CBC with diff, Vit D, zinc, DHEAS, testosterone    Thank you for allowing us to participate in your care. Your findings, instructions and follow-up plan are as follows:         When should I call my doctor?    If you are worsening or not improving, please, contact us or seek urgent care as noted below.     Who should I call with questions (adults)?    [facility] (adult and pediatric): [phone]    [facility] (adult): [phone]    For urgent needs outside of business hours call the [facility] at [phone] and ask for the dermatology resident on call    If this is a medical emergency and you are unable to reach an ER, Call [phone]    Who should I call with questions (pediatric)?  [facility]- Pediatric Dermatology  Dr. Ailyn Leroy, Dr. Juan Mandujano, Dr. Cora Garcia, Brittnee Coreas, PA  Dr. Kalani Mercedes, Dr. Zenaida Freire & Dr. Ryan Talbot  Non Urgent  Nurse Triage Line; [phone]- Arleth and Andie RN Care Coordinators   [place] (/Complex ) [phone]    If you need a prescription refill, please contact your pharmacy. Refills are approved or denied by our physicians during normal business hours, Monday through Fridays  Per office policy, refills will not be granted if you have not been seen within the past year (or sooner depending on your child's condition).    Scheduling Information:  Pediatric Appointment Scheduling and Call Center [phone]  Radiology Scheduling- [phone]  Sedation Unit Scheduling- [phone]  [place] Scheduling- General [phone]; Pediatric Dermatology [phone]  Main  Services: [phone]  Mongolian: [phone]  Togolese: [phone]  Hmong/Dimitri/Hebrew: [phone]  Preadmission Nursing Department Fax Number: [phone] (fax all pre-operative paperwork to this number)    For urgent matters arising during evenings, weekends, or holidays that cannot wait for normal business hours please call [phone] and ask for the dermatology resident on call to be paged.

## 2022-02-14 NOTE — NURSING NOTE
Dermatology Rooming Note    Citlali Keenan's goals for this visit include:   Chief Complaint   Patient presents with     Hair Loss     Citlali would like to discuss hair loss and a rash     Ivonne Vee, [facility]

## 2022-02-14 NOTE — PROGRESS NOTES
[facility] Dermatology Note  Encounter Date: Feb 14, 2022  Telephone ([phone]). Location of teledermatologist: [facility] [place]. Start time: 1:30 PM. End time: 1:45 PM.    Dermatology Problem List:  1. Lichen planopilaris  - Bx on 12/9/19 consistent with LPP  - Rx: ILK, hydroxychloroquine 200 mg once daily, doxycycline 100 mg daily, and dutasteride 0.5 mg   ____________________________________________    Assessment & Plan:     # Lichen plano pilaris   Patient seems to have done a great job at controlling inflammatory component of the disease with Dr. Triana of [facility]. At this point, we recommend focusing on hair growth with low dose oral minoxidil.    - Continue hydroxychloroquine 200 mg once daily, doxycycline 100 mg daily, and dutasteride 0.5 mg per Dr. Triana  - Consider low dose oral minoxidil  - Consider these labs:  CMP, CBC with diff, Vit D, zinc, DHEAS, testosterone    # Non specific rash  Follow-up with Dr. Triana as scheduled on 2/25/22 for potential biopsy.    # Bruising of hands   Follow-up with PCP or Dr. Triana for further evaluation.    Procedures Performed:    None    Follow-up: prn for new or changing concerns    Staff and Scribe:     Scribe Disclosure:  I, Karli Abreu, am serving as a scribe to document services personally performed by Zenaida Freire MD based on data collection and the provider's statements to me.     Provider Disclosure:   The documentation recorded by the scribe accurately reflects the services I personally performed and the decisions made by me.    Zenaida Freire MD  Professor and Chair  Department of Dermatology  [facility]: Phone: [phone], Fax:[phone]  [facility]: Phone: [phone], Fax: [phone]      ____________________________________________    CC: Hair Loss  (Citlali would like to discuss hair loss and a rash)    HPI:  Ms. Citlali Keenan is a(n) 67 year old female who presents today as a new patient for LPP. The patient has been following with Dr. Triana for treatment of her atopic dermatitis and LPP. Per patient, this is biopsy proven LPP. For the LPP, she has been taking hydroxychloroquine 200 mg once daily, doxycycline 100 mg daily, and dutasteride 0.5 mg. She shampoos once every 5 days with [facility] and uses the same conditioner. She has also been receiving ILK injections. Patient reports that all of these treatments have been particularly helpful at decreasing the inflammation and erythema. However, she is concerned that tapering off the above medications will hurt her progress. She is taking a calcium and D3 supplement. No excess body area.     Additionally, the patient reports having an intermittent rash that used to come mainly in summer. However, more recently, she developed the rash on her side. She has been using Zyrtec BID, hydroxyzine prn at night, and fluocinonide. Patient is otherwise feeling well, without additional skin concerns.    Labs Reviewed:  N/A    Physical Exam:  Vitals: LMP 01/01/2011   SKIN: Teledermatology photos were reviewed; image quality and interpretability: acceptable. Image date: 2/14/22.  - Decreased hair density on bilateral pariatal regions.   - Mid frontal scalp focal patchy alopecia.   - No significant erythema, scale, or folliculitis.   - Trunk with large erythematous patch.  - Ecchymotic lesions on hands.  - No other lesions of concern on areas examined.     Medications:  Current Outpatient Medications   Medication     BIOTIN     cetirizine (ZYRTEC) 10 MG tablet     doxycycline monohydrate (MONODOX) 100 MG capsule     dutasteride (AVODART) 0.5 MG capsule     escitalopram (LEXAPRO) 5 MG tablet     hydroxychloroquine (PLAQUENIL) 200 MG tablet     lisinopril (ZESTRIL) 10 MG tablet     naproxen (NAPROSYN) 500 MG tablet     rizatriptan  (MAXALT) 10 MG tablet     Vitamin D, Cholecalciferol, 25 MCG (1000 UT) TABS     No current facility-administered medications for this visit.      Past Medical/Surgical History:   Patient Active Problem List   Diagnosis     CARDIOVASCULAR SCREENING; LDL GOAL LESS THAN 160     Advanced directives, counseling/discussion     HTN (hypertension)     Raynaud's phenomenon     Osteopenia     Menopause     Family history of breast cancer in mother     Moderate episode of recurrent major depressive disorder (H)     Alopecia     Neck pain     Cervical high risk HPV (human papillomavirus) test positive     Past Medical History:   Diagnosis Date     Alopecia      Cervical high risk HPV (human papillomavirus) test positive 11/20/2020     DEPRESSIVE DISORDER NEC 04/13/2006     Hypertension      Lichen planopilaris     In remission, follows with derm     Migraine 03/13/2012     Migraine without aura, with intractable migraine, so stated, without mention of status migrainosus      Mild major depression (H) 08/10/2011     Non-seasonal allergic rhinitis        CC Estephanie Colon MD  [address] [place] [address] on close of this encounter.

## 2022-04-20 DIAGNOSIS — I10 ESSENTIAL HYPERTENSION: ICD-10-CM

## 2022-04-20 NOTE — TELEPHONE ENCOUNTER
Routing refill request to provider for review/approval because:  Indira given x 1 and patient did not follow up, please advise  Labs not current:  BMP  Patient needs to be seen because it has been more than 1 year since last office visit.  Failing bp    Trinidad Garcia RN

## 2022-04-21 DIAGNOSIS — I10 ESSENTIAL HYPERTENSION: ICD-10-CM

## 2022-04-21 RX ORDER — LISINOPRIL 10 MG/1
TABLET ORAL
Qty: 90 TABLET | Refills: 0 | OUTPATIENT
Start: 2022-04-21

## 2022-04-21 NOTE — TELEPHONE ENCOUNTER
I haven't seen the patient since 11/2020 - needs follow-up before I will authorize refills. If she has appt scheduled, I will provide bridge refill.     Gin Schaffer, DNP, APRN, CNP

## 2022-04-25 RX ORDER — LISINOPRIL 10 MG/1
TABLET ORAL
Qty: 90 TABLET | Refills: 0 | OUTPATIENT
Start: 2022-04-25

## 2022-04-25 NOTE — TELEPHONE ENCOUNTER
Overdue for labs and follow-up. If she schedules an appt, will provide refill to bridge until her office visit.   Gin Schaffer, DNP, APRN, CNP

## 2022-04-25 NOTE — TELEPHONE ENCOUNTER
Routing refill request to provider for review/approval because:  ACE Inhibitors (Including Combos) Protocol Failed 04/21/2022 08:23 PM   Protocol Details  Blood pressure under 140/90 in past 12 months    Recent (12 mo) or future (30 days) visit within the authorizing provider's specialty    Normal serum creatinine on file in past 12 months    Normal serum potassium on file in past 12 months     Elisa Coronel RN

## 2022-04-28 NOTE — TELEPHONE ENCOUNTER
Called patient and she states that she's being seen at [facility] now for healthcare - prescription requests should not be coming to us. Patient is informing her pharmacy.     Closing encounter.     Aar Wynne CMA

## 2022-10-16 ENCOUNTER — HEALTH MAINTENANCE LETTER (OUTPATIENT)
Age: 67
End: 2022-10-16

## 2023-03-26 ENCOUNTER — HEALTH MAINTENANCE LETTER (OUTPATIENT)
Age: 68
End: 2023-03-26

## 2024-06-01 ENCOUNTER — HEALTH MAINTENANCE LETTER (OUTPATIENT)
Age: 69
End: 2024-06-01